# Patient Record
Sex: MALE | Race: WHITE | NOT HISPANIC OR LATINO | Employment: OTHER | URBAN - METROPOLITAN AREA
[De-identification: names, ages, dates, MRNs, and addresses within clinical notes are randomized per-mention and may not be internally consistent; named-entity substitution may affect disease eponyms.]

---

## 2017-10-12 ENCOUNTER — TRANSFERRED RECORDS (OUTPATIENT)
Dept: HEALTH INFORMATION MANAGEMENT | Facility: CLINIC | Age: 52
End: 2017-10-12

## 2017-10-18 NOTE — TELEPHONE ENCOUNTER
"APPT INFORMATION    Date /Time: 10/24/17 3PM   Reason for Appt: L Shoulder Large Deep Lipoma   Ref Provider/Clinic: Dr. Dawood To   Patient Contact (Y/N) & Call Details: No - pt is referre   Action: Faxed request to [facility] for recs/img     RECORDS CLINIC NAME  (\"None\" if no records ) RECEIVED RECS & IMG? Y/N   (may include other helpful notes)   Internal Clinics: none         External Clinics: [facility]  Waiting for records/imaging            "

## 2017-10-19 NOTE — TELEPHONE ENCOUNTER
Records Received From:  O     Date/Exam/Location  (specify location if different)   Office Notes:  10/18/17, 10/6/17   Radiology Reports:  xray left shoulder 10/6/17  MR arthrogram left shoulder 10/12/17  MRI Upper ext joint left 1/29/07- [facility]     [facility] Notified (Y/N): no

## 2017-10-20 NOTE — TELEPHONE ENCOUNTER
Records Received From:  [facility]     Date/Exam/Location  (specify location if different)   Procedure Notes:  (include injections) - arthrography injection without cortisone left shoulder 10/12/17

## 2017-10-23 NOTE — TELEPHONE ENCOUNTER
Received Imaging From: [facility]    Image Type (x): Disc:_____    Pacs:__x___      Exam Date/Name: MRI L Shoulder 10/12/17 Comments: Notified Paz to pull     Phone Call:    Who did you talk to? (or) Who did you call?  Sarah at [facility]    Call Detail/Action: Asked for XR L Shoulder 10/6/17 be pushed to [facility]. She will notify her IT dept to push

## 2017-10-24 ENCOUNTER — PRE VISIT (OUTPATIENT)
Dept: ORTHOPEDICS | Facility: CLINIC | Age: 52
End: 2017-10-24

## 2017-10-24 ENCOUNTER — OFFICE VISIT (OUTPATIENT)
Dept: ORTHOPEDICS | Facility: CLINIC | Age: 52
End: 2017-10-24

## 2017-10-24 VITALS — HEIGHT: 72 IN | WEIGHT: 254.3 LBS | BODY MASS INDEX: 34.44 KG/M2

## 2017-10-24 DIAGNOSIS — D17.9 INTRAMUSCULAR LIPOMA: Primary | ICD-10-CM

## 2017-10-24 NOTE — PROGRESS NOTES
Dear Dr. To,    Thank you for asking me to see Geronimo Arvizu. 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 It is my understanding that you've recommend this be removed prior to your shoulder reconstructive surgery. I'm in agreement with that plan. Details of our assessment and plan are described in Dr. Vega's release note. I agree with his assessment and plan.    In summary we will schedule surgical excision at a convenient time for the patient. Risk and benefits were reviewed. All questions were answered. He was seen today in consultation at your request for 30 minutes with greater than 15 minutes spent in answering questions coordinating his care  Answers for HPI/ROS submitted by the patient on 10/24/2017   General Symptoms: No  Skin Symptoms: No  HENT Symptoms: No  EYE SYMPTOMS: No  HEART SYMPTOMS: No  LUNG SYMPTOMS: No  INTESTINAL SYMPTOMS: No  URINARY SYMPTOMS: No  REPRODUCTIVE SYMPTOMS: No  SKELETAL SYMPTOMS: No  BLOOD SYMPTOMS: No  NERVOUS SYSTEM SYMPTOMS: No  MENTAL HEALTH SYMPTOMS: No

## 2017-10-24 NOTE — NURSING NOTE
Teaching Flowsheet   Relevant Diagnosis: removal left shoulder tumor  Teaching Topic: preop     Person(s) involved in teaching:   Patient     Motivation Level:  Asks Questions: Yes  Eager to Learn: Yes  Cooperative: Yes  Receptive (willing/able to accept information): Yes  Any cultural factors/Jain beliefs that may influence understanding or compliance? No       Patient demonstrates understanding of the following:  Reason for the appointment, diagnosis and treatment plan: Yes  Knowledge of proper use of medications and conditions for which they are ordered (with special attention to potential side effects or drug interactions): Yes  Which situations necessitate calling provider and whom to contact: Yes       Teaching Concerns Addressed:        Proper use and care of soap (medical equip, care aids, etc.): Yes  Nutritional needs and diet plan: Yes  Pain management techniques: Yes  Wound Care: Yes  How and/when to access community resources: NA     Instructional Materials Used/Given: surgery packet reviewed with the patient.  He is in [facility] and very busy at this time of year.  He will review his schedule and call us to schedule before the end of the year.  He will obtain H&P with PCP. He takes no medications, and will reduce his alcohol intake prior to surgery.  He has no further questions at this time.

## 2017-10-24 NOTE — PROGRESS NOTES
[facility] Physicians, Orthopaedic Surgery Consultation    Geronimo Arvizu MRN# [id number]   Age: 52 year old YOB: 1965     Requesting physician: Dawood To            Assessment and Plan:   Assessment:  52-year-old RHD male with an asympomatic left deltoid intramuscular lipoma found during workup of chronic left shoulder instability and nearly doubled in size since previous MRI in 2007.     Plan:  We discussed with the patient today that we would recommend this lipomatous tumor be removed prior to the planned procedure for his shoulder instability. We discussed with the patient the risks benefits and alternatives of the procedure particularly the risk of infection and the risk of recurrence. Patient understands these risks. We also discussed with the patient that he may undergo his subsequent procedure 4-6 weeks following the removal of the fatty tumor. We will also send the tissue for biopsy and discussed with the patient the findings of this. Patient will schedule excision of the fatty tumor which will likely be done through a anterior deltopectoral approach and if the tumor is not able to be removed entirely from the front we may make a second incision and approach this from posterior.           History of Present Illness:   52 year old male  chief complaint: Left shoulder instability, incidental finding soft tissue tumor    HPI: 52-year-old RHD male with long-standing left shoulder pain worked up at outside facility for recurrent dislocations. Patient reports that he orginially dislocated his left shoulder when he was in eighth grade and subsequently has had a proximally 50 episodes of subluxation or dislocation of the shoulder. He has had to have his shoulder reduced in the emergency department with the last time being approximately 20 years ago. Currently, he reports his shoulder does pop in and out and he is able to reduce it on its own. He reports his symptoms are worse in external  rotation. He has participated in physical therapy in the past but none recently. Patient was evaluated at [facility] orthopedics and an MRI was obtained. This did show labral and Hill-Sachs pathology also re-demonstrated a intramuscular lipoma, increased in size. Patient was referred to the tumor clinic see the orthopaedics oncologists at [place] for consideration of removal of the lipoma as this would potentially interfere with the surgical procedure for treatment of his labral pathology and shoulder instability.    In regards to the mass in his left shoulder he reports he first noticed it approximately 10 years ago and was noted on previous MRI. He reports it has possibly doubled in size since that time. The mass itself is asymptomatic to him.           Physical Exam:     EXAMINATION pertinent findings:   VITAL SIGNS: Height 1.829 m (6'), weight 115.3 kg (254 lb 4.8 oz).  Body mass index is 34.49 kg/(m^2).  RESP: non labored breathing   ABD: benign   SKIN: grossly normal   LYMPHATIC: grossly normal   NEURO: grossly normal   VASCULAR: satisfactory perfusion of all extremities   MUSCULOSKELETAL:   Patient's left upper extremity was examined today. His skin is overall intact but there is a visible and palpable lesion prominently in the anterior deltoid. This mass is fairly well circumscribed and does not move with internal/external rotation of the shoulder. The mass is nontender to him. Again there are no overlying skin changes. The mass itself feels solid and again fairly well circumscribed. Patient has somewhat limited range of motion in the shoulder particularly with forward flexion. He has sufficient external and internal rotation when compared to the chondral side. He has 5 out of 5 strength in his deltoid and biceps. He does have a positive [place]'s test with negative speed and is in test. He is CMS intact distally.            Data:   All laboratory data reviewed  All imaging studies reviewed by me    MRI  dated 1/29/2017 previously demonstrated fat deep to the deltoid muscle with the appearance of a lipoma measuring proximal placed 7 x 6 x 1.2 cm.  MRI dated 10/12/2017 again demonstrates a fatty mass deep or within the deep aspect of the deltoid muscle again with the parents of the lipoma measuring approximately 11 x 11 x 3.1 cm. This also demonstrates a anterior inferior labral tear and a large Hill-Sachs deformity posteriorly.    Patient seen and discussed with Dr. Janiya Vega MD  PGY-4 Orthopaedic Surgery    Total Time = 30 min, 50% of which was spent in counseling and coordination of care as documented above.      DATA for DOCUMENTATION:         Past Medical History:     Patient Active Problem List   Diagnosis     Left  Kne pain     Recurrent dislocation of shoulder joint     No past medical history on file.    Also see scanned health assessment forms.       Past Surgical History:   No past surgical history on file.         Social History:     Social History     Social History     Marital status:      Spouse name: N/A     Number of children: N/A     Years of education: N/A     Occupational History     Not on file.     Social History Main Topics     Smoking status: Never Smoker     Smokeless tobacco: Not on file     Alcohol use Yes      Comment: Occ.      Drug use: No     Sexual activity: Not on file     Other Topics Concern     Not on file     Social History Narrative   Works as          Family History:     No family history on file.         Medications:     No current outpatient prescriptions on file.     No current facility-administered medications for this visit.               Review of Systems:   A comprehensive 10 point review of systems (constitutional, ENT, cardiac, peripheral vascular, lymphatic, respiratory, GI, , Musculoskeletal, skin, Neurological) was performed and found to be negative except as described in this note.     See intake form completed by patient    Answers for  HPI/ROS submitted by the patient on 10/24/2017   General Symptoms: No  Skin Symptoms: No  HENT Symptoms: No  EYE SYMPTOMS: No  HEART SYMPTOMS: No  LUNG SYMPTOMS: No  INTESTINAL SYMPTOMS: No  URINARY SYMPTOMS: No  REPRODUCTIVE SYMPTOMS: No  SKELETAL SYMPTOMS: No  BLOOD SYMPTOMS: No  NERVOUS SYSTEM SYMPTOMS: No  MENTAL HEALTH SYMPTOMS: No

## 2017-10-24 NOTE — NURSING NOTE
Chief Complaint   Patient presents with     Consult     Pt states that he is here today for some type of Tumor of his Left Shoulder. He states that he would like to have a Left Shoulder Arthroplasty done, but needs the tumor removed before he can do so. Referring:  CORY FOX       52 year old  1965    Ht 1.829 m (6')  Wt 115.3 kg (254 lb 4.8 oz)  BMI 34.49 kg/m2            Pain Assessment  Patient Currently in Pain: Yes  Additional Pain Assessment Tools: Word Scale  Word Pain Scale: Mild pain  Primary Pain Location: Shoulder  Pain Orientation: Left  Aggravating Factors:  (Pt states that he had contrast injected for his recent MRI that has made it more bothersome, but very mild he states.)               [facility] - [place], [place] - [address]    No Known Allergies  No current outpatient prescriptions on file.     No current facility-administered medications for this visit.                  Casisdy Spangler C.M.A.

## 2017-10-24 NOTE — LETTER
10/24/2017       RE: Geronimo Arvizu  [address]  [place] [address]     Dear Colleague,    Thank you for referring your patient, Geronimo Arvizu, to the [facility] at [facility]. Please see a copy of my visit note below.      [facility] Physicians, Orthopaedic Surgery Consultation    Geronimo Arvizu MRN# [id number]   Age: 52 year old YOB: 1965     Requesting physician: Dawood To            Assessment and Plan:   Assessment:  52-year-old RHD male with an asympomatic left deltoid intramuscular lipoma found during workup of chronic left shoulder instability and nearly doubled in size since previous MRI in 2007.     Plan:  We discussed with the patient today that we would recommend this lipomatous tumor be removed prior to the planned procedure for his shoulder instability. We discussed with the patient the risks benefits and alternatives of the procedure particularly the risk of infection and the risk of recurrence. Patient understands these risks. We also discussed with the patient that he may undergo his subsequent procedure 4-6 weeks following the removal of the fatty tumor. We will also send the tissue for biopsy and discussed with the patient the findings of this. Patient will schedule excision of the fatty tumor which will likely be done through a anterior deltopectoral approach and if the tumor is not able to be removed entirely from the front we may make a second incision and approach this from posterior.           History of Present Illness:   52 year old male  chief complaint: Left shoulder instability, incidental finding soft tissue tumor    HPI: 52-year-old RHD male with long-standing left shoulder pain worked up at outside facility for recurrent dislocations. Patient reports that he orginially dislocated his left shoulder when he was in eighth grade and subsequently has had a proximally 50 episodes of subluxation or  dislocation of the shoulder. He has had to have his shoulder reduced in the emergency department with the last time being approximately 20 years ago. Currently, he reports his shoulder does pop in and out and he is able to reduce it on its own. He reports his symptoms are worse in external rotation. He has participated in physical therapy in the past but none recently. Patient was evaluated at [facility] orthopedics and an MRI was obtained. This did show labral and Hill-Sachs pathology also re-demonstrated a intramuscular lipoma, increased in size. Patient was referred to the tumor clinic see the orthopaedics oncologists at [place] for consideration of removal of the lipoma as this would potentially interfere with the surgical procedure for treatment of his labral pathology and shoulder instability.    In regards to the mass in his left shoulder he reports he first noticed it approximately 10 years ago and was noted on previous MRI. He reports it has possibly doubled in size since that time. The mass itself is asymptomatic to him.           Physical Exam:     EXAMINATION pertinent findings:   VITAL SIGNS: Height 1.829 m (6'), weight 115.3 kg (254 lb 4.8 oz).  Body mass index is 34.49 kg/(m^2).  RESP: non labored breathing   ABD: benign   SKIN: grossly normal   LYMPHATIC: grossly normal   NEURO: grossly normal   VASCULAR: satisfactory perfusion of all extremities   MUSCULOSKELETAL:   Patient's left upper extremity was examined today. His skin is overall intact but there is a visible and palpable lesion prominently in the anterior deltoid. This mass is fairly well circumscribed and does not move with internal/external rotation of the shoulder. The mass is nontender to him. Again there are no overlying skin changes. The mass itself feels solid and again fairly well circumscribed. Patient has somewhat limited range of motion in the shoulder particularly with forward flexion. He has sufficient external and internal  rotation when compared to the chondral side. He has 5 out of 5 strength in his deltoid and biceps. He does have a positive [place]'s test with negative speed and is in test. He is CMS intact distally.            Data:   All laboratory data reviewed  All imaging studies reviewed by me    MRI dated 1/29/2017 previously demonstrated fat deep to the deltoid muscle with the appearance of a lipoma measuring proximal placed 7 x 6 x 1.2 cm.  MRI dated 10/12/2017 again demonstrates a fatty mass deep or within the deep aspect of the deltoid muscle again with the parents of the lipoma measuring approximately 11 x 11 x 3.1 cm. This also demonstrates a anterior inferior labral tear and a large Hill-Sachs deformity posteriorly.    Patient seen and discussed with Dr. Janiya Vega MD  PGY-4 Orthopaedic Surgery    Total Time = 30 min, 50% of which was spent in counseling and coordination of care as documented above.      DATA for DOCUMENTATION:         Past Medical History:     Patient Active Problem List   Diagnosis     Left  Kne pain     Recurrent dislocation of shoulder joint     No past medical history on file.    Also see scanned health assessment forms.       Past Surgical History:   No past surgical history on file.         Social History:     Social History     Social History     Marital status:      Spouse name: N/A     Number of children: N/A     Years of education: N/A     Occupational History     Not on file.     Social History Main Topics     Smoking status: Never Smoker     Smokeless tobacco: Not on file     Alcohol use Yes      Comment: Occ.      Drug use: No     Sexual activity: Not on file     Other Topics Concern     Not on file     Social History Narrative   Works as [employer]           Family History:   No family history on file.         Medications:     No current outpatient prescriptions on file.     No current facility-administered medications for this visit.               Review of Systems:   A  comprehensive 10 point review of systems (constitutional, ENT, cardiac, peripheral vascular, lymphatic, respiratory, GI, , Musculoskeletal, skin, Neurological) was performed and found to be negative except as described in this note.     See intake form completed by patient    Dear Dr. To,    Thank you for asking me to see Geronimo Arvizu. 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 It is my understanding that you've recommend this be removed prior to your shoulder reconstructive surgery. I'm in agreement with that plan. Details of our assessment and plan are described in Dr. Vega's release note. I agree with his assessment and plan.    In summary we will schedule surgical excision at a convenient time for the patient. Risk and benefits were reviewed. All questions were answered. He was seen today in consultation at your request for 30 minutes with greater than 15 minutes spent in answering questions coordinating his care    Sincerely,    Callum Denson MD

## 2017-10-24 NOTE — MR AVS SNAPSHOT
After Visit Summary   10/24/2017    Geroinmo Arvizu    MRN: [id number]           Patient Information     Date Of Birth          1965        Visit Information        Provider Department      10/24/2017 3:00 PM Callum Denson MD [facility]        Today's Diagnoses     Intramuscular lipoma    -  1       Follow-ups after your visit        Who to contact     Please call your clinic at [phone] to:    Ask questions about your health    Make or cancel appointments    Discuss your medicines    Learn about your test results    Speak to your doctor   If you have compliments or concerns about an experience at your clinic, or if you wish to file a complaint, please contact [facility] Patient Relations at [phone] or email us at [email or web address]         Additional Information About Your Visit        MyChart Information     [employer] gives you secure access to your electronic health record. If you see a primary care provider, you can also send messages to your care team and make appointments. If you have questions, please call your primary care clinic.  If you do not have a primary care provider, please call [phone] and they will assist you.      [employer] is an electronic gateway that provides easy, online access to your medical records. With [employer], you can request a clinic appointment, read your test results, renew a prescription or communicate with your care team.     To access your existing account, please contact your [facility] Clinic or call [phone] for assistance.        Care EveryWhere ID     This is your Care EveryWhere ID. This could be used by other organizations to access your [place] medical records  [id number]        Your Vitals Were     Height BMI (Body Mass Index)                1.829 m (6') 34.49 kg/m2           Blood Pressure from Last 3 Encounters:   01/24/07 134/83    Weight from Last  3 Encounters:   10/24/17 115.3 kg (254 lb 4.8 oz)   01/24/07 105.3 kg (232 lb 3.2 oz)              We Performed the Following     Stephanie-Operative Worksheet ([place]/Janiya)        Primary Care Provider    Physician No Ref-Primary       NO REF-PRIMARY PHYSICIAN        Equal Access to Services     MADELEINESTORMY DOROTHY : Hadii aad ku hadasho Soomaali, waaxda luqadaha, qaybta kaalmada adekimmyyada, kiana abbasi . So [facility] [phone].    ATENCIÓN: Si habla español, tiene a tejeda disposición servicios gratuitos de asistencia lingüística. Llame al [phone].    We comply with applicable federal civil rights laws and [place] laws. We do not discriminate on the basis of race, color, national origin, age, disability, sex, sexual orientation, or gender identity.            Thank you!     Thank you for choosing [facility]  for your care. Our goal is always to provide you with excellent care. Hearing back from our patients is one way we can continue to improve our services. Please take a few minutes to complete the written survey that you may receive in the mail after your visit with us. Thank you!             Your Updated Medication List - Protect others around you: Learn how to safely use, store and throw away your medicines at www.disposemymeds.org.      Notice  As of 10/24/2017  5:09 PM    You have not been prescribed any medications.

## 2017-11-02 ENCOUNTER — TELEPHONE (OUTPATIENT)
Dept: ORTHOPEDICS | Facility: CLINIC | Age: 52
End: 2017-11-02

## 2017-11-10 ENCOUNTER — OFFICE VISIT (OUTPATIENT)
Dept: FAMILY MEDICINE | Facility: CLINIC | Age: 52
End: 2017-11-10
Payer: COMMERCIAL

## 2017-11-10 VITALS
BODY MASS INDEX: 33.05 KG/M2 | WEIGHT: 244 LBS | DIASTOLIC BLOOD PRESSURE: 88 MMHG | HEIGHT: 72 IN | TEMPERATURE: 98.6 F | HEART RATE: 71 BPM | SYSTOLIC BLOOD PRESSURE: 137 MMHG

## 2017-11-10 DIAGNOSIS — Z12.11 COLON CANCER SCREENING: ICD-10-CM

## 2017-11-10 DIAGNOSIS — C85.94: ICD-10-CM

## 2017-11-10 DIAGNOSIS — Z01.818 PREOP GENERAL PHYSICAL EXAM: Primary | ICD-10-CM

## 2017-11-10 LAB
BASOPHILS # BLD AUTO: 0 10E9/L (ref 0–0.2)
BASOPHILS NFR BLD AUTO: 0.2 %
CREAT SERPL-MCNC: 1.05 MG/DL (ref 0.66–1.25)
DIFFERENTIAL METHOD BLD: NORMAL
EOSINOPHIL # BLD AUTO: 0.3 10E9/L (ref 0–0.7)
EOSINOPHIL NFR BLD AUTO: 3.5 %
ERYTHROCYTE [DISTWIDTH] IN BLOOD BY AUTOMATED COUNT: 12.9 % (ref 10–15)
GFR SERPL CREATININE-BSD FRML MDRD: 74 ML/MIN/1.7M2
HCT VFR BLD AUTO: 47.7 % (ref 40–53)
HGB BLD-MCNC: 15.8 G/DL (ref 13.3–17.7)
LYMPHOCYTES # BLD AUTO: 2.8 10E9/L (ref 0.8–5.3)
LYMPHOCYTES NFR BLD AUTO: 32.8 %
MCH RBC QN AUTO: 29.3 PG (ref 26.5–33)
MCHC RBC AUTO-ENTMCNC: 33.1 G/DL (ref 31.5–36.5)
MCV RBC AUTO: 89 FL (ref 78–100)
MONOCYTES # BLD AUTO: 0.8 10E9/L (ref 0–1.3)
MONOCYTES NFR BLD AUTO: 9.6 %
NEUTROPHILS # BLD AUTO: 4.6 10E9/L (ref 1.6–8.3)
NEUTROPHILS NFR BLD AUTO: 53.9 %
PLATELET # BLD AUTO: 301 10E9/L (ref 150–450)
RBC # BLD AUTO: 5.39 10E12/L (ref 4.4–5.9)
WBC # BLD AUTO: 8.5 10E9/L (ref 4–11)

## 2017-11-10 PROCEDURE — 85025 COMPLETE CBC W/AUTO DIFF WBC: CPT | Performed by: FAMILY MEDICINE

## 2017-11-10 PROCEDURE — 82565 ASSAY OF CREATININE: CPT | Performed by: FAMILY MEDICINE

## 2017-11-10 PROCEDURE — 36415 COLL VENOUS BLD VENIPUNCTURE: CPT | Performed by: FAMILY MEDICINE

## 2017-11-10 PROCEDURE — 93000 ELECTROCARDIOGRAM COMPLETE: CPT | Performed by: FAMILY MEDICINE

## 2017-11-10 PROCEDURE — 99202 OFFICE O/P NEW SF 15 MIN: CPT | Performed by: FAMILY MEDICINE

## 2017-11-10 NOTE — LETTER
November 15, 2017      Geronimo Luh  [address] [facility] [place] [address]        Dear ,    We are writing to inform you of your test results.  Please inform the patient of this normal testOK for surgery.A copy of these results is enclosed.   If you have any questions or concerns, please call the clinic at the number listed above.       Sincerely,        Jorge L Serra MD

## 2017-11-10 NOTE — MR AVS SNAPSHOT
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 This includes signs of a cold or flu (such as a sore throat, runny nose, cough, rash or fever).    Do not smoke, drink alcohol or take over the counter medicine (unless your surgeon or primary care doctor tells you to) for the 24 hours before and after surgery.    If you take prescribed drugs: Follow your doctor s orders about which medicines to take and which to stop until after surgery.    Eating and drinking prior to surgery: follow the instructions from your surgeon    Take a shower or bath the night before surgery. Use the soap your surgeon gave you to gently clean your skin. If you do not have soap from your surgeon, use your regular soap. Do not shave or scrub the surgery site.  Wear clean pajamas and have clean sheets on your bed.             Thank you for choosing [facility].  You may be receiving a survey in the mail from [employer] regarding your visit today.  Please take a few minutes to complete and return the survey to let us know how we are doing.      If you have questions or concerns, please contact us via [employer]-SIM or you can contact your care team at [phone].    Our Clinic hours are:  Monday 6:40 am  to 7:00 pm  Tuesday -Friday 6:40 am to 5:00 pm    The [place] outpatient lab hours are:  Monday - Friday 6:10 am to 4:45 pm  Saturdays 7:00 am to 11:00 am  Appointments are required, call [phone]    If you have clinical questions after  hours or would like to schedule an appointment,  call the clinic at [phone].      IMPRESSION:                                                    Reason for surgery/procedure: Geronimo Arvizu (: 1965) presents for pre-operative evaluation assessment as requested by Dr. Denson.  He requires evaluation and anesthesia risk assessment prior to undergoing surgery/procedure for treatment of left shoulder area lymphoma, tumor. Proposed procedure: Removal Left Shoulder Tumor.    The proposed surgical procedure is considered LOW risk.    REVISED CARDIAC RISK INDEX  The patient has the following serious cardiovascular risks for perioperative complications such as (MI, PE, VFib and 3  AV Block):  No serious cardiac risks  INTERPRETATION: 0 risks: Class I (very low risk - 0.4% complication rate)    The patient has the following additional risks for perioperative complications:  No identified additional risks      ICD-10-CM    1. Preop general physical exam Z01.818        RECOMMENDATIONS:                                                      --Patient is to take all scheduled medications on the day before surgery EXCEPT for modifications listed below.  Take no aspirin or advil or aleve for one week before surgery. Tylenol is OK.   Your stomach should be empty for  8 hours before surgery.     APPROVAL GIVEN to proceed with proposed procedure, without further diagnostic evaluation            Follow-ups after your visit        Additional Services     GASTROENTEROLOGY ADULT REF PROCEDURE ONLY       Last Lab Result: No results found for: CR  Body mass index is 33.09 kg/(m^2).     Needed:  No  Language:  English    Patient will be contacted to schedule procedure.     Please be aware that coverage of these services is subject to the terms and limitations of your health insurance plan.  Call member services at your health plan with any benefit or coverage questions.  Any procedures must be performed at a [place]  facility OR coordinated by your clinic's referral office.    Please bring the following with you to your appointment:    (1) Any X-Rays, CTs or MRIs which have been performed.  Contact the facility where they were done to arrange for  prior to your scheduled appointment.    (2) List of current medications   (3) This referral request   (4) Any documents/labs given to you for this referral                  Your next 10 appointments already scheduled     Nov 16, 2017   Procedure with Callum Denson MD   [facility] ([facility])    [address] [place] [address]  5th Floor  [employer] [address] [phone]           Located in the Clinics and Surgery Center at [address] [place] [address].   parking is very convenient and highly recommended.  is a $6 flat rate fee.  Both  and self parkers should enter the main arrival plaza from [facility]; parking attendants will direct you based on your parking preference.              Who to contact     If you have questions or need follow up information about today's clinic visit or your schedule please contact [facility] directly at [phone].  Normal or non-critical lab and imaging results will be communicated to you by MyChart, letter or phone within 4 business days after the clinic has received the results. If you do not hear from us within 7 days, please contact the clinic through [employer] or phone. If you have a critical or abnormal lab result, we will notify you by phone as soon as possible.  Submit refill requests through [employer] or call your pharmacy and they will forward the refill request to us. Please allow 3 business days for your refill to be completed.          Additional Information About Your Visit        [employer] Information     [employer] gives you secure access to your electronic health record. If you see a primary care provider, you can also send messages to  your care team and make appointments. If you have questions, please call your primary care clinic.  If you do not have a primary care provider, please call [phone] and they will assist you.        Care EveryWhere ID     This is your Care EveryWhere ID. This could be used by other organizations to access your [facility] medical records  [id number]        Your Vitals Were     Pulse Temperature Height BMI (Body Mass Index)          71 98.6  F (37  C) (Tympanic) 6' (1.829 m) 33.09 kg/m2         Blood Pressure from Last 3 Encounters:   11/10/17 142/87   01/24/07 134/83    Weight from Last 3 Encounters:   11/10/17 244 lb (110.7 kg)   10/24/17 254 lb 4.8 oz (115.3 kg)   01/24/07 232 lb 3.2 oz (105.3 kg)              We Performed the Following     CBC with platelets differential     Creatinine     EKG 12-lead complete w/read - Clinics     GASTROENTEROLOGY ADULT REF PROCEDURE ONLY        Primary Care Provider    Physician No Ref-Primary       NO REF-PRIMARY PHYSICIAN        Equal Access to Services     JASON DE LA CRUZ : Hadii aad ku hadasho Soomaali, waaxda luqadaha, qaybta kaalmada adejudson, kiana abbasi . So [facility] [phone].    ATENCIÓN: Si habla español, tiene a tejeda disposición servicios gratuitos de asistencia lingüística. Llame al [phone].    We comply with applicable federal civil rights laws and [place] laws. We do not discriminate on the basis of race, color, national origin, age, disability, sex, sexual orientation, or gender identity.            Thank you!     Thank you for choosing [facility]  for your care. Our goal is always to provide you with excellent care. Hearing back from our patients is one way we can continue to improve our services. Please take a few minutes to complete the written survey that you may receive in the mail after your visit with us. Thank you!             Your Updated Medication List - Protect others around you: Learn how to safely use,  store and throw away your medicines at www.disposemymeds.org.      Notice  As of 11/10/2017  2:03 PM    You have not been prescribed any medications.

## 2017-11-10 NOTE — PATIENT INSTRUCTIONS
Before Your Surgery      Call your surgeon if there is any change in your health. This includes signs of a cold or flu (such as a sore throat, runny nose, cough, rash or fever).    Do not smoke, drink alcohol or take over the counter medicine (unless your surgeon or primary care doctor tells you to) for the 24 hours before and after surgery.    If you take prescribed drugs: Follow your doctor s orders about which medicines to take and which to stop until after surgery.    Eating and drinking prior to surgery: follow the instructions from your surgeon    Take a shower or bath the night before surgery. Use the soap your surgeon gave you to gently clean your skin. If you do not have soap from your surgeon, use your regular soap. Do not shave or scrub the surgery site.  Wear clean pajamas and have clean sheets on your bed.             Thank you for choosing [facility].  You may be receiving a survey in the mail from [facility] regarding your visit today.  Please take a few minutes to complete and return the survey to let us know how we are doing.      If you have questions or concerns, please contact us via [employer] or you can contact your care team at [phone].    Our Clinic hours are:  Monday 6:40 am  to 7:00 pm   6:40 am to 5:00 pm    The [place] outpatient lab hours are:  Monday - Friday 6:10 am to 4:45 pm   7:00 am to 11:00 am  Appointments are required, call [phone]    If you have clinical questions after hours or would like to schedule an appointment,  call the clinic at [phone].      IMPRESSION:                                                    Reason for surgery/procedure: Geronimo Arvizu (: 1965) presents for pre-operative evaluation assessment as requested by Dr. Denson.  He requires evaluation and anesthesia risk assessment prior to undergoing surgery/procedure for treatment of left shoulder area lymphoma, tumor. Proposed procedure: Removal Left Shoulder  Tumor.    The proposed surgical procedure is considered LOW risk.    REVISED CARDIAC RISK INDEX  The patient has the following serious cardiovascular risks for perioperative complications such as (MI, PE, VFib and 3  AV Block):  No serious cardiac risks  INTERPRETATION: 0 risks: Class I (very low risk - 0.4% complication rate)    The patient has the following additional risks for perioperative complications:  No identified additional risks      ICD-10-CM    1. Preop general physical exam Z01.818        RECOMMENDATIONS:                                                      --Patient is to take all scheduled medications on the day before surgery EXCEPT for modifications listed below.  Take no aspirin or advil or aleve for one week before surgery. Tylenol is OK.   Your stomach should be empty for  8 hours before surgery.     APPROVAL GIVEN to proceed with proposed procedure, without further diagnostic evaluation

## 2017-11-10 NOTE — NURSING NOTE
Chief Complaint   Patient presents with     Pre-Op Exam     Pre-op physical.     Shoulder Pain     Ever since he had the MRI of the left shoulder done with the dye, he has been having pain.     Health Maintenance     Colonoscopy order and prep given to the patient.       Initial /88  Pulse 71  Temp 98.6  F (37  C) (Tympanic)  Ht 6' (1.829 m)  Wt 244 lb (110.7 kg)  BMI 33.09 kg/m2 Estimated body mass index is 33.09 kg/(m^2) as calculated from the following:    Height as of this encounter: 6' (1.829 m).    Weight as of this encounter: 244 lb (110.7 kg).  Medication Reconciliation: complete

## 2017-11-10 NOTE — PROGRESS NOTES
[facility]  [address] [address]  [phone]  Dept: [phone]    PRE-OP EVALUATION:  Today's date: 11/10/2017    Geronimo Arvizu (: 1965) presents for pre-operative evaluation assessment as requested by Dr. Denson.  He requires evaluation and anesthesia risk assessment prior to undergoing surgery/procedure for treatment of left shoulder area lymphoma, tumor. Proposed procedure: Removal Left Shoulder Tumor.    Date of Surgery/ Procedure: 17  Time of Surgery/ Procedure: 9:05 am per [facility].  Hospital/Surgical Facility: [facility]  Fax number for surgical facility: No fax is needed as it is in [facility].  Primary Physician: Jorge L Serra  Type of Anesthesia Anticipated: General    Patient has a Health Care Directive or Living Will:  NO    1. NO - Do you have a history of heart attack, stroke, stent, bypass or surgery on an artery in the head, neck, heart or legs?  2. NO - Do you ever have any pain or discomfort in your chest?  3. NO - Do you have a history of  Heart Failure?  4. NO - Are you troubled by shortness of breath when: walking on the level, up a slight hill or at night?  5. NO - Do you currently have a cold, bronchitis or other respiratory infection?  6. NO - Do you have a cough, shortness of breath or wheezing?  7. NO - Do you sometimes get pains in the calves of your legs when you walk?  8. NO - Do you or anyone in your family have previous history of blood clots?  9. NO - Do you or does anyone in your family have a serious bleeding problem such as prolonged bleeding following surgeries or cuts?  10. NO - Have you ever had problems with anemia or been told to take iron pills?  11. NO - Have you had any abnormal blood loss such as black, tarry or bloody stools, or abnormal vaginal bleeding?  12. NO - Have you ever had a blood transfusion?  13. NO - Have you or any of your relatives ever had problems with anesthesia?  14. NO - Do you  have sleep apnea, excessive snoring or daytime drowsiness?  15. NO - Do you have any prosthetic heart valves?  16. NO - Do you have prosthetic joints?      HPI:                                                      Brief HPI related to upcoming procedure: see above.       See problem list for active medical problems.  Problems all longstanding and stable, except as noted/documented.  See ROS for pertinent symptoms related to these conditions.                                                                                                  .    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  would get MRI evaluate ligamenture 0 -  Problem list name updated by automated process. Provider to review        History reviewed. No pertinent past medical history.  History reviewed. No pertinent surgical history.  No current outpatient prescriptions on file.     OTC products: None, except as noted above    No Known Allergies   Latex Allergy: NO    Social History   Substance Use Topics     Smoking status: Former Smoker     Smokeless tobacco: Never Used      Comment: Quit in his early 30's.     Alcohol use Yes      Comment: Occ.      History   Drug Use No       REVIEW OF SYSTEMS:                                                    C: NEGATIVE for fever, chills, change in weight  E/M: NEGATIVE for ear, mouth and throat problems  R: NEGATIVE for significant cough or SOB  CV: NEGATIVE for chest pain, palpitations or peripheral edema    EXAM:                                                    /87  Pulse 71  Temp 98.6  F (37  C) (Tympanic)  Ht 6' (1.829 m)   Wt 244 lb (110.7 kg)  BMI 33.09 kg/m2  Exam:  GENERAL APPEARANCE: healthy, alert and no distress  EYES: EOMI,  PERRL  HENT: ear canals and TM's normal and nose and mouth without ulcers or lesions  NECK: no adenopathy, no asymmetry, masses, or scars and thyroid normal to palpation  RESP: lungs clear to auscultation - no rales, rhonchi or wheezes  CV: regular rates and rhythm, normal S1 S2, no S3 or S4 and no murmur, click or rub -  ABDOMEN:  soft, nontender, no HSM or masses and bowel sounds normal  GU_male: testicles normal without atrophy or masses, no hernias and penis normal without urethral discharge  MS: on the anterior portion of the left shoulder there is a 13-15 cm soft swelling. It is not red or tender.   SKIN: no suspicious lesions or rashes  NEURO: Normal strength and tone, sensory exam grossly normal, mentation intact and speech normal  PSYCH: mentation appears normal and affect normal/bright  LYMPHATICS: No axillary, cervical, inguinal, or supraclavicular nodes      DIAGNOSTICS:                                                      EKG: appears normal, NSR, normal axis, normal intervals, no acute ST/T changes c/w ischemia, no LVH by voltage criteria, unchanged from previous tracings  Labs Drawn and in Process:   Unresulted Labs Ordered in the Past 30 Days of this Admission     No orders found from 2017 to 2017.        No results for input(s): HGB, PLT, INR, NA, POTASSIUM, CR, A1C in the last 58225 hours.     IMPRESSION:                                                    Reason for surgery/procedure: Geronimo Arvizu (: 1965) presents for pre-operative evaluation assessment as requested by Dr. Denson.  He requires evaluation and anesthesia risk assessment prior to undergoing surgery/procedure for treatment of left shoulder area lymphoma, tumor. Proposed procedure: Removal Left Shoulder Tumor.    The proposed surgical procedure is considered LOW risk.    REVISED CARDIAC RISK INDEX  The  patient has the following serious cardiovascular risks for perioperative complications such as (MI, PE, VFib and 3  AV Block):  No serious cardiac risks  INTERPRETATION: 0 risks: Class I (very low risk - 0.4% complication rate)    The patient has the following additional risks for perioperative complications:  No identified additional risks      ICD-10-CM    1. Preop general physical exam Z01.818        RECOMMENDATIONS:                                                      --Patient is to take all scheduled medications on the day before surgery EXCEPT for modifications listed below.  Take no aspirin or advil or aleve for one week before surgery. Tylenol is OK.   Your stomach should be empty for  8 hours before surgery.     APPROVAL GIVEN to proceed with proposed procedure, without further diagnostic evaluation       Signed Electronically by: Jorge L Serra MD    Copy of this evaluation report is provided to requesting physician.    [facility] Preop Guidelines

## 2017-11-15 ENCOUNTER — ANESTHESIA EVENT (OUTPATIENT)
Dept: SURGERY | Facility: AMBULATORY SURGERY CENTER | Age: 52
End: 2017-11-15

## 2017-11-16 ENCOUNTER — HOSPITAL ENCOUNTER (OUTPATIENT)
Facility: AMBULATORY SURGERY CENTER | Age: 52
End: 2017-11-16
Attending: ORTHOPAEDIC SURGERY

## 2017-11-16 ENCOUNTER — SURGERY (OUTPATIENT)
Age: 52
End: 2017-11-16

## 2017-11-16 ENCOUNTER — ANESTHESIA (OUTPATIENT)
Dept: SURGERY | Facility: AMBULATORY SURGERY CENTER | Age: 52
End: 2017-11-16

## 2017-11-16 VITALS
DIASTOLIC BLOOD PRESSURE: 87 MMHG | BODY MASS INDEX: 33.18 KG/M2 | HEIGHT: 72 IN | HEART RATE: 71 BPM | TEMPERATURE: 97 F | WEIGHT: 245 LBS | SYSTOLIC BLOOD PRESSURE: 135 MMHG | OXYGEN SATURATION: 95 % | RESPIRATION RATE: 20 BRPM

## 2017-11-16 DIAGNOSIS — D17.9 INTRAMUSCULAR LIPOMA: Primary | ICD-10-CM

## 2017-11-16 RX ORDER — ACETAMINOPHEN 325 MG/1
650 TABLET ORAL
Status: DISCONTINUED | OUTPATIENT
Start: 2017-11-16 | End: 2017-11-17 | Stop reason: HOSPADM

## 2017-11-16 RX ORDER — ONDANSETRON 2 MG/ML
4 INJECTION INTRAMUSCULAR; INTRAVENOUS EVERY 30 MIN PRN
Status: DISCONTINUED | OUTPATIENT
Start: 2017-11-16 | End: 2017-11-17 | Stop reason: HOSPADM

## 2017-11-16 RX ORDER — BUPIVACAINE HYDROCHLORIDE AND EPINEPHRINE 2.5; 5 MG/ML; UG/ML
INJECTION, SOLUTION INFILTRATION; PERINEURAL PRN
Status: DISCONTINUED | OUTPATIENT
Start: 2017-11-16 | End: 2017-11-16 | Stop reason: HOSPADM

## 2017-11-16 RX ORDER — FENTANYL CITRATE 50 UG/ML
25-50 INJECTION, SOLUTION INTRAMUSCULAR; INTRAVENOUS EVERY 5 MIN PRN
Status: DISCONTINUED | OUTPATIENT
Start: 2017-11-16 | End: 2017-11-16 | Stop reason: HOSPADM

## 2017-11-16 RX ORDER — DEXAMETHASONE SODIUM PHOSPHATE 4 MG/ML
INJECTION, SOLUTION INTRA-ARTICULAR; INTRALESIONAL; INTRAMUSCULAR; INTRAVENOUS; SOFT TISSUE PRN
Status: DISCONTINUED | OUTPATIENT
Start: 2017-11-16 | End: 2017-11-16

## 2017-11-16 RX ORDER — GABAPENTIN 300 MG/1
300 CAPSULE ORAL ONCE
Status: COMPLETED | OUTPATIENT
Start: 2017-11-16 | End: 2017-11-16

## 2017-11-16 RX ORDER — MEPERIDINE HYDROCHLORIDE 25 MG/ML
12.5 INJECTION INTRAMUSCULAR; INTRAVENOUS; SUBCUTANEOUS
Status: DISCONTINUED | OUTPATIENT
Start: 2017-11-16 | End: 2017-11-17 | Stop reason: HOSPADM

## 2017-11-16 RX ORDER — ONDANSETRON 4 MG/1
4 TABLET, ORALLY DISINTEGRATING ORAL
Status: DISCONTINUED | OUTPATIENT
Start: 2017-11-16 | End: 2017-11-17 | Stop reason: HOSPADM

## 2017-11-16 RX ORDER — ONDANSETRON 4 MG/1
4 TABLET, ORALLY DISINTEGRATING ORAL EVERY 30 MIN PRN
Status: DISCONTINUED | OUTPATIENT
Start: 2017-11-16 | End: 2017-11-17 | Stop reason: HOSPADM

## 2017-11-16 RX ORDER — NALOXONE HYDROCHLORIDE 0.4 MG/ML
.1-.4 INJECTION, SOLUTION INTRAMUSCULAR; INTRAVENOUS; SUBCUTANEOUS
Status: DISCONTINUED | OUTPATIENT
Start: 2017-11-16 | End: 2017-11-17 | Stop reason: HOSPADM

## 2017-11-16 RX ORDER — OXYCODONE HYDROCHLORIDE 5 MG/1
5 TABLET ORAL
Status: COMPLETED | OUTPATIENT
Start: 2017-11-16 | End: 2017-11-16

## 2017-11-16 RX ORDER — GLYCOPYRROLATE 0.2 MG/ML
INJECTION, SOLUTION INTRAMUSCULAR; INTRAVENOUS PRN
Status: DISCONTINUED | OUTPATIENT
Start: 2017-11-16 | End: 2017-11-16

## 2017-11-16 RX ORDER — SODIUM CHLORIDE, SODIUM LACTATE, POTASSIUM CHLORIDE, CALCIUM CHLORIDE 600; 310; 30; 20 MG/100ML; MG/100ML; MG/100ML; MG/100ML
INJECTION, SOLUTION INTRAVENOUS CONTINUOUS
Status: DISCONTINUED | OUTPATIENT
Start: 2017-11-16 | End: 2017-11-17 | Stop reason: HOSPADM

## 2017-11-16 RX ORDER — PROPOFOL 10 MG/ML
INJECTION, EMULSION INTRAVENOUS PRN
Status: DISCONTINUED | OUTPATIENT
Start: 2017-11-16 | End: 2017-11-16

## 2017-11-16 RX ORDER — ACETAMINOPHEN 325 MG/1
975 TABLET ORAL ONCE
Status: COMPLETED | OUTPATIENT
Start: 2017-11-16 | End: 2017-11-16

## 2017-11-16 RX ORDER — ONDANSETRON 2 MG/ML
INJECTION INTRAMUSCULAR; INTRAVENOUS PRN
Status: DISCONTINUED | OUTPATIENT
Start: 2017-11-16 | End: 2017-11-16

## 2017-11-16 RX ORDER — KETOROLAC TROMETHAMINE 30 MG/ML
INJECTION, SOLUTION INTRAMUSCULAR; INTRAVENOUS PRN
Status: DISCONTINUED | OUTPATIENT
Start: 2017-11-16 | End: 2017-11-16

## 2017-11-16 RX ORDER — LIDOCAINE HYDROCHLORIDE 20 MG/ML
INJECTION, SOLUTION INFILTRATION; PERINEURAL PRN
Status: DISCONTINUED | OUTPATIENT
Start: 2017-11-16 | End: 2017-11-16

## 2017-11-16 RX ORDER — OXYCODONE HYDROCHLORIDE 5 MG/1
5-10 TABLET ORAL EVERY 4 HOURS PRN
Qty: 30 TABLET | Refills: 0 | Status: SHIPPED | OUTPATIENT
Start: 2017-11-16 | End: 2019-11-22

## 2017-11-16 RX ORDER — HYDROMORPHONE HYDROCHLORIDE 1 MG/ML
.3-.5 INJECTION, SOLUTION INTRAMUSCULAR; INTRAVENOUS; SUBCUTANEOUS EVERY 10 MIN PRN
Status: DISCONTINUED | OUTPATIENT
Start: 2017-11-16 | End: 2017-11-16 | Stop reason: HOSPADM

## 2017-11-16 RX ORDER — FENTANYL CITRATE 50 UG/ML
INJECTION, SOLUTION INTRAMUSCULAR; INTRAVENOUS PRN
Status: DISCONTINUED | OUTPATIENT
Start: 2017-11-16 | End: 2017-11-16

## 2017-11-16 RX ORDER — SODIUM CHLORIDE, SODIUM LACTATE, POTASSIUM CHLORIDE, CALCIUM CHLORIDE 600; 310; 30; 20 MG/100ML; MG/100ML; MG/100ML; MG/100ML
INJECTION, SOLUTION INTRAVENOUS CONTINUOUS
Status: DISCONTINUED | OUTPATIENT
Start: 2017-11-16 | End: 2017-11-16 | Stop reason: HOSPADM

## 2017-11-16 RX ADMIN — ACETAMINOPHEN 975 MG: 325 TABLET ORAL at 07:59

## 2017-11-16 RX ADMIN — OXYCODONE HYDROCHLORIDE 5 MG: 5 TABLET ORAL at 11:34

## 2017-11-16 RX ADMIN — FENTANYL CITRATE 50 MCG: 50 INJECTION, SOLUTION INTRAMUSCULAR; INTRAVENOUS at 09:56

## 2017-11-16 RX ADMIN — GLYCOPYRROLATE 0.2 MG: 0.2 INJECTION, SOLUTION INTRAMUSCULAR; INTRAVENOUS at 09:39

## 2017-11-16 RX ADMIN — SODIUM CHLORIDE, SODIUM LACTATE, POTASSIUM CHLORIDE, CALCIUM CHLORIDE: 600; 310; 30; 20 INJECTION, SOLUTION INTRAVENOUS at 08:12

## 2017-11-16 RX ADMIN — ONDANSETRON 4 MG: 2 INJECTION INTRAMUSCULAR; INTRAVENOUS at 10:16

## 2017-11-16 RX ADMIN — DEXAMETHASONE SODIUM PHOSPHATE 4 MG: 4 INJECTION, SOLUTION INTRA-ARTICULAR; INTRALESIONAL; INTRAMUSCULAR; INTRAVENOUS; SOFT TISSUE at 09:18

## 2017-11-16 RX ADMIN — FENTANYL CITRATE 50 MCG: 50 INJECTION, SOLUTION INTRAMUSCULAR; INTRAVENOUS at 09:39

## 2017-11-16 RX ADMIN — GABAPENTIN 300 MG: 300 CAPSULE ORAL at 08:00

## 2017-11-16 RX ADMIN — BUPIVACAINE HYDROCHLORIDE AND EPINEPHRINE 20 ML: 2.5; 5 INJECTION, SOLUTION INFILTRATION; PERINEURAL at 10:32

## 2017-11-16 RX ADMIN — KETOROLAC TROMETHAMINE 30 MG: 30 INJECTION, SOLUTION INTRAMUSCULAR; INTRAVENOUS at 10:16

## 2017-11-16 RX ADMIN — PROPOFOL 300 MG: 10 INJECTION, EMULSION INTRAVENOUS at 09:16

## 2017-11-16 RX ADMIN — BUPIVACAINE HYDROCHLORIDE AND EPINEPHRINE 10 ML: 2.5; 5 INJECTION, SOLUTION INFILTRATION; PERINEURAL at 09:42

## 2017-11-16 RX ADMIN — LIDOCAINE HYDROCHLORIDE 100 MG: 20 INJECTION, SOLUTION INFILTRATION; PERINEURAL at 09:16

## 2017-11-16 NOTE — ANESTHESIA POSTPROCEDURE EVALUATION
Patient: Geronimo Arvizu    Procedure(s):  Removal Left Shoulder Tumor - Wound Class: I-Clean    Diagnosis:Tumor  Diagnosis Additional Information: No value filed.    Anesthesia Type:  General, LMA    Note:  Anesthesia Post Evaluation    Patient location during evaluation: PACU  Patient participation: Able to fully participate in evaluation  Level of consciousness: awake and alert  Pain management: adequate  Airway patency: patent  Cardiovascular status: acceptable  Respiratory status: acceptable  Hydration status: acceptable  PONV: none     Anesthetic complications: None          Last vitals:  Vitals:    11/16/17 1049 11/16/17 1100 11/16/17 1110   BP: 137/87 (!) 138/91 135/87   Pulse:   71   Resp: 21 20    Temp: 36.2  C (97.2  F) 36.1  C (97  F)    SpO2: 91% 94% 95%         Electronically Signed By: Win Brown MD  November 16, 2017  11:28 AM

## 2017-11-16 NOTE — ANESTHESIA CARE TRANSFER NOTE
Patient: Geronimo Arvizu    Procedure(s):  Removal Left Shoulder Tumor - Wound Class: I-Clean    Diagnosis: Tumor  Diagnosis Additional Information: No value filed.    Anesthesia Type:   General, LMA     Note:  Airway :Room Air  Patient transferred to:PACU  Comments: VSS and WNL, denies pain, no PONV, report to Ginny SIEGELHandoff Report: Identifed the Patient, Identified the Reponsible Provider, Reviewed the pertinent medical history, Discussed the surgical course, Reviewed Intra-OP anesthesia mangement and issues during anesthesia, Set expectations for post-procedure period and Allowed opportunity for questions and acknowledgement of understanding      Vitals: (Last set prior to Anesthesia Care Transfer)    CRNA VITALS  11/16/2017 1015 - 11/16/2017 1051      11/16/2017             Pulse: 97    SpO2: 96 %    Resp Rate (observed): (!)  3                Electronically Signed By: TIFFANY Doyle CRNA  November 16, 2017  10:51 AM

## 2017-11-16 NOTE — ANESTHESIA PREPROCEDURE EVALUATION
Anesthesia Evaluation     . Pt has had prior anesthetic.     No history of anesthetic complications          ROS/MED HX    ENT/Pulmonary:  - neg pulmonary ROS     Neurologic:  - neg neurologic ROS     Cardiovascular:  - neg cardiovascular ROS   (+) ----. : . . . :. . Previous cardiac testing date:results:date: results:ECG reviewed date:11/4/17 results:NSR date: results:          METS/Exercise Tolerance:  >4 METS   Hematologic:  - neg hematologic  ROS       Musculoskeletal:   (+) , , other musculoskeletal- recurrent left shoulder dislocation      GI/Hepatic:  - neg GI/hepatic ROS       Renal/Genitourinary:  - ROS Renal section negative       Endo:  - neg endo ROS       Psychiatric:  - neg psychiatric ROS       Infectious Disease:  - neg infectious disease ROS       Malignancy:   (+) Malignancy (Shoulder) History of Lymphoma/Leukemia          Other:    (+) no H/O Chronic Pain,                   Physical Exam  Normal systems: cardiovascular and pulmonary    Airway   Mallampati: I  TM distance: >3 FB  Neck ROM: full    Dental   Comment: Broken tooth on the upper right molars    Cardiovascular       Pulmonary                     Anesthesia Plan      History & Physical Review  History and physical reviewed and following examination; no interval change.    ASA Status:  2 .    NPO Status:  > 6 hours    Plan for General and LMA with Intravenous and Propofol induction. Maintenance will be Balanced.    PONV prophylaxis:  Ondansetron (or other 5HT-3) and Dexamethasone or Solumedrol       Postoperative Care  Postoperative pain management:  Multi-modal analgesia.      Consents  Anesthetic plan, risks, benefits and alternatives discussed with:  Patient.  Use of blood products discussed: No .   .        ANESTHESIA PREOP EVALUATION    Procedure: Procedure(s):  Removal Left Shoulder Tumor - Wound Class: I-Clean    HPI: Geronimo Arvizu is a 52 year old male presenting for above procedure.     PMHx/PSHx/ROS:  No past medical history  on file.    Past Surgical History:   Procedure Laterality Date     HC TOOTH EXTRACTION W/FORCEP           Past Anes Hx: No personal or family h/o anesthesia problems    Soc Hx:   Social History   Substance Use Topics     Smoking status: Former Smoker     Smokeless tobacco: Never Used      Comment: Quit in his early 30's.     Alcohol use Yes      Comment: Occ.        Allergies: No Known Allergies    Meds:     (Not in a hospital admission)    No current outpatient prescriptions on file.       Physical Exam:  Vitals: BP (!) 141/93  Pulse 63  Temp 36.2  C (97.2  F) (Temporal)  Resp 16  Ht 1.829 m (6')  Wt 111.1 kg (245 lb)  SpO2 95%  BMI 33.23 kg/m2  BMI= Body mass index is 33.23 kg/(m^2).      Labs:  UPT: No results found for: HCGQUANT      BMP:  Recent Labs   Lab Test  11/10/17   1407   CR  1.05     CBC:   Recent Labs   Lab Test  11/10/17   1407   WBC  8.5   RBC  5.39   HGB  15.8   HCT  47.7   MCV  89   MCH  29.3   MCHC  33.1   RDW  12.9   PLT  301     Coags:  No results for input(s): INR, PTT, FIBR in the last 30426 hours.    Assessment/Plan:  - ASA 2  - General with LMA with standard ASA monitors, IV induction, balanced anesthetic  - PIV  - Antibiotics per surgery  - PONV prophylaxis  - Relevant risks, benefits, alternatives and the anesthetic plan were discussed with patient/family or family representative.  All questions were answered and there was agreement to proceed.      Armando GARCIA-3, D.O.    11/16/2017  8:16 AM                    History and physical assessed; Patient examined. I have reviewed and agree with this pre-op assessment and anesthetic plan with addendums as necessary.     Risks and alternatives presented and discussed. Patient and family agree. All questions answered.      Win Brown MD  Staff Anesthesiologist  *[phone]

## 2017-11-16 NOTE — BRIEF OP NOTE
[facility]    Orthopaedic Surgery  Brief Operative Note    Pre-operative diagnosis: Tumor   Post-operative diagnosis Left shoulder mass   Procedure: Procedure(s):  Removal Left Shoulder Tumor - Wound Class: I-Clean   Surgeon: Callum Denson MD   Assistants(s): Sarah Whitten PA-C; Jaguar Sin MD   Anesthesia: General    Estimated blood loss: Less than 100 ml   Total IV fluids: (See anesthesia record)   Blood transfusion: (See anesthesia record)   Total urine output: (See anesthesia record)   Drains: None   Specimens:   ID Type Source Tests Collected by Time Destination   A : Left shoulder tumor Tissue Shoulder SURGICAL PATHOLOGY EXAM Callum Denson MD 11/16/2017 10:00 AM       Findings: None   Complications: None   Implants: None    Post-op Plan:  WB status: FWB avoid heavy lifting x 1 week  Device:  Sling prn for comfort  DVT Prophylaxis:  Not needed   Follow-up:  2 weeks with Dr. Denson/ANGELINA for wound check

## 2017-11-16 NOTE — IP AVS SNAPSHOT
[facility]    [address] [place] [address]    Phone:  [phone]    Fax:  [phone]                                       After Visit Summary   11/16/2017    Geronimo Arvizu    MRN: [id number]           After Visit Summary Signature Page     I have received my discharge instructions, and my questions have been answered. I have discussed any challenges I see with this plan with the nurse or doctor.    ..........................................................................................................................................  Patient/Patient Representative Signature      ..........................................................................................................................................  Patient Representative Print Name and Relationship to Patient    ..................................................               ................................................  Date                                            Time    ..........................................................................................................................................  Reviewed by Signature/Title    ...................................................              ..............................................  Date                                                            Time

## 2017-11-16 NOTE — IP AVS SNAPSHOT
MRN:[id number]                      After Visit Summary   11/16/2017    Geronimo Arvizu    MRN: [id number]           Thank you!     Thank you for choosing [facility] for your care. Our goal is always to provide you with excellent care. Hearing back from our patients is one way we can continue to improve our services. Please take a few minutes to complete the written survey that you may receive in the mail after you visit with us. Thank you!        Patient Information     Date Of Birth          1965        About your hospital stay     You were admitted on:  November 16, 2017 [facility]    You were discharged on:  November 16, 2017       Who to Call     For medical emergencies, please call 911.  For non-urgent questions about your medical care, please call your primary care provider or clinic, [phone]  For questions related to your surgery, please call your surgery clinic        Attending Provider     Provider Callum Chambers MD Orthopedics       Primary Care Provider Office Phone # Fax #    Jorge L Serra [phone]      After Care Instructions      Diet as Tolerated       Return to diet before surgery, unless instructed otherwise.            Discharge Instructions       Review outpatient procedure discharge instructions with patient as directed by Provider            Discharge Instructions - Lifting Limit (specify)       Lifting limit  2 pounds until seen at Post-op follow up appointment.            Dressing Change        IF leaking, remove and redress. Wash hands before and after and use gloves.            Ice to affected area       Ice pack to surgical site every 15 minutes per hour for 24 hours            No driving or operating machinery        until the day after procedure            Notify Provider       CALL YOUR PHYSICIAN IF:  1.  Your pain begins to worsen and is unable to be  controlled with your medications.  2.  Excessive redness or drainage of cloudy or bloody material from the wounds (Clear red tinted fluid and some mild drainage should be expected). Drainage of any kind 5 days after surgery should be reported to the doctor.  3.  You have a temperature elevation greater than 101.5    4.  You have pain, swelling or redness in your calf. You have numbness or weakness in your leg or foot.    You many call your physician at [phone] during business hours.    For after hours or on weekends, you may call the hospital at [phone] and ask for the orthopedic resident on call.            Remove dressing - at 72 hours        OK to remove dressing in 4-5 days.  Ok to shower and get incision wet at that time.  No soaking in a tub or pool for 2 weeks.            Return to clinic       Return to clinic in 2 weeks            Shower       Cover dressing if dressing is not going to be changed today.  OK to remove dressing in 4 days and shower and get incision wet.  No soaking in a tub or pool for 2 weeks.            Weight bearing - As tolerated                 Further instructions from your care team       [facility]  Home Care Following Anesthesia  For 24 hours after surgery:  1. Get plenty of rest.  A responsible adult must stay with you for at least 24 hours after you leave the surgery center.  2. Do not drive or use heavy equipment.  If you have weakness or tingling, don't drive or use heavy equipment until this feeling goes away.   3. Do not drink alcohol.   4. Avoid strenuous or risky activities.  Ask for help when climbing stairs.  5. You may feel lightheaded.  IF so, sit for a few minutes before standing.  Have someone help you get up.   6. If you have nausea (feel sick to your stomach): Drink only clear liquids such as apple juice, ginger ale, broth or 7-Up.  Rest may also help.  Be sure to drink enough fluids.  Move to a regular diet as you feel  able.   7. You may have a slight fever.  Call the doctor if your fever is over 100 F (37.7 C) (taken under the tongue) or lasts longer than 24 hours.  8. You may have a dry mouth, a sore throat, muscle aches or trouble sleeping. These should go away after 24 hours.  9. Do not make important or legal decisions.               Tips for taking pain medications  To get the best pain relief possible, remember these points:    Take pain medications as directed, before pain becomes severe.    Pain medication can upset your stomach: taking it with food may help.    Constipation is a common side effect of pain medication. Drink plenty of  fluids.    Eat foods high in fiber. Take a stool softener if recommended by your doctor or pharmacist.    Do not drink alcohol, drive or operate machinery while taking pain medications.    Ask about other ways to control pain, such as with heat, ice or relaxation.    Tylenol/Acetaminophen Consumption  To help encourage the safe use of acetaminophen, the makers of TYLENOL  have lowered the maximum daily dose for single-ingredient Extra Strength TYLENOL  (acetaminophen) products sold in the [place] from 8 pills per day (4,000 mg) to 6 pills per day (3,000 mg). The dosing interval has also changed from 2 pills every 4-6 hours to 2 pills every 6 hours.    If you feel your pain relief is insufficient, you may take Tylenol/Acetaminophen in addition to your narcotic pain medication.     Be careful not to exceed 3,000 mg of Tylenol/Acetaminophen in a 24 hour period from all sources.    If you are taking extra strength Tylenol/acetaminophen (500 mg), the maximum dose is 6 tablets in 24 hours.    If you are taking regular strength acetaminophen (325 mg), the maximum dose is 9 tablets in 24 hours.    Call a doctor for any of the followin. Signs of infection (fever, growing tenderness at the surgery site, a large amount of drainage or bleeding, severe pain, foul-smelling drainage, redness,  swelling).  2. It has been over 8 to 10 hours since surgery and you are still not able to urinate (pass water).  3. Headache for over 24 hours.  4. Numbness, tingling or weakness the day after surgery (if you had spinal anesthesia).  Your doctor is:  Dr. Callum Denson, Orthopaedics: [phone]                    Or dial [phone] and ask for the resident on call for:  Orthopaedics  For emergency care, call the:  [facility] Emergency Department: [phone] (TTY for hearing impaired: [phone])                Pending Results     Date and Time Order Name Status Description    11/16/2017 1000 Surgical pathology exam In process             Admission Information     Date & Time Provider Department Dept. Phone    11/16/2017 Callum Denson MD [facility] [phone]      Your Vitals Were     Blood Pressure Pulse Temperature Respirations Height Weight    135/87 71 97  F (36.1  C) (Temporal) 20 1.829 m (6') 111.1 kg (245 lb)    Pulse Oximetry BMI (Body Mass Index)                95% 33.23 kg/m2          [employer] Information     [employer] gives you secure access to your electronic health record. If you see a primary care provider, you can also send messages to your care team and make appointments. If you have questions, please call your primary care clinic.  If you do not have a primary care provider, please call [phone] and they will assist you.      [employer] is an electronic gateway that provides easy, online access to your medical records. With [employer], you can request a clinic appointment, read your test results, renew a prescription or communicate with your care team.     To access your existing account, please contact your [facility] or call [phone] for assistance.        Care EveryWhere ID     This is your Care EveryWhere ID. This could be used by other organizations to access your [facility] medical records  [id number]        Equal Access  to Services     [facility]: Mode Sun, wamike chauhan, qaybtrinh lawrence, kiana fan. So [facility] [phone].    ATENCIÓN: Si habla malika, tiene a tejeda disposición servicios gratuitos de asistencia lingüística. Llame al [phone].    We comply with applicable federal civil rights laws and [place] laws. We do not discriminate on the basis of race, color, national origin, age, disability, sex, sexual orientation, or gender identity.               Review of your medicines      START taking        Dose / Directions    oxyCODONE IR 5 MG tablet   Commonly known as:  ROXICODONE   Used for:  Intramuscular lipoma        Dose:  5-10 mg   Take 1-2 tablets (5-10 mg) by mouth every 4 hours as needed for pain or other (Moderate to Severe)   Quantity:  30 tablet   Refills:  0            Where to get your medicines      Some of these will need a paper prescription and others can be bought over the counter. Ask your nurse if you have questions.     Bring a paper prescription for each of these medications     oxyCODONE IR 5 MG tablet                Protect others around you: Learn how to safely use, store and throw away your medicines at www.disposemymeds.org.             Medication List: This is a list of all your medications and when to take them. Check marks below indicate your daily home schedule. Keep this list as a reference.      Medications           Morning Afternoon Evening Bedtime As Needed    oxyCODONE IR 5 MG tablet   Commonly known as:  ROXICODONE   Take 1-2 tablets (5-10 mg) by mouth every 4 hours as needed for pain or other (Moderate to Severe)

## 2017-11-23 LAB — COPATH REPORT: NORMAL

## 2017-12-01 ENCOUNTER — OFFICE VISIT (OUTPATIENT)
Dept: ORTHOPEDICS | Facility: CLINIC | Age: 52
End: 2017-12-01

## 2017-12-01 DIAGNOSIS — D17.9 INTRAMUSCULAR LIPOMA: Primary | ICD-10-CM

## 2017-12-01 NOTE — NURSING NOTE
Reason For Visit:   Chief Complaint   Patient presents with     Surgical Followup     Left shoulder large deep lipoma surrounding humeral head.  F/U.  DOS: 11/16/2017       Primary MD: Jorge L Serra  Ref. MD: CORY FOX      Age: 52 year old    ?  No      There were no vitals taken for this visit.      Pain Assessment  Patient Currently in Pain: Denies                   Current Outpatient Prescriptions   Medication Sig Dispense Refill     oxyCODONE IR (ROXICODONE) 5 MG tablet Take 1-2 tablets (5-10 mg) by mouth every 4 hours as needed for pain or other (Moderate to Severe) (Patient not taking: Reported on 12/1/2017) 30 tablet 0       No Known Allergies    Eri Santamaria, ATC

## 2017-12-01 NOTE — MR AVS SNAPSHOT
After Visit Summary   12/1/2017    Geronimo Arvizu    MRN: [id number]           Patient Information     Date Of Birth          1965        Visit Information        Provider Department      12/1/2017 12:45 PM Callum Denson MD [facility]        Today's Diagnoses     Intramuscular lipoma    -  1       Follow-ups after your visit        Who to contact     Please call your clinic at [phone] to:    Ask questions about your health    Make or cancel appointments    Discuss your medicines    Learn about your test results    Speak to your doctor   If you have compliments or concerns about an experience at your clinic, or if you wish to file a complaint, please contact [facility] Patient Relations at [phone] or email us at [email or web address]         Additional Information About Your Visit        MyChart Information     [employer] gives you secure access to your electronic health record. If you see a primary care provider, you can also send messages to your care team and make appointments. If you have questions, please call your primary care clinic.  If you do not have a primary care provider, please call [phone] and they will assist you.      [employer] is an electronic gateway that provides easy, online access to your medical records. With [employer], you can request a clinic appointment, read your test results, renew a prescription or communicate with your care team.     To access your existing account, please contact your [facility] or call [phone] for assistance.        Care EveryWhere ID     This is your Care EveryWhere ID. This could be used by other organizations to access your [facility] medical records  [id number]         Blood Pressure from Last 3 Encounters:   11/16/17 135/87   11/10/17 137/88   01/24/07 134/83    Weight from Last 3 Encounters:   11/16/17 111.1 kg (245 lb)   11/10/17 110.7 kg  (244 lb)   10/24/17 115.3 kg (254 lb 4.8 oz)              Today, you had the following     No orders found for display       Primary Care Provider Office Phone # Fax #    Jorge L Serra [phone] [address] [facility] [address]        Equal Access to Services     JASON DE LA CRUZ : Hadii aad ku hadasho Soomaali, waaxda luqadaha, qaybta kaalmada adeegyada, waxay idiin hayaan adeeg kharash laantionetten . So [facility] [phone].    ATENCIÓN: Si habla español, tiene a tejeda disposición servicios gratuitos de asistencia lingüística. Llame al [phone].    We comply with applicable federal civil rights laws and [place] laws. We do not discriminate on the basis of race, color, national origin, age, disability, sex, sexual orientation, or gender identity.            Thank you!     Thank you for choosing [facility]  for your care. Our goal is always to provide you with excellent care. Hearing back from our patients is one way we can continue to improve our services. Please take a few minutes to complete the written survey that you may receive in the mail after your visit with us. Thank you!             Your Updated Medication List - Protect others around you: Learn how to safely use, store and throw away your medicines at www.disposemymeds.org.          This list is accurate as of: 12/1/17  2:58 PM.  Always use your most recent med list.                   Brand Name Dispense Instructions for use Diagnosis    oxyCODONE IR 5 MG tablet    ROXICODONE    30 tablet    Take 1-2 tablets (5-10 mg) by mouth every 4 hours as needed for pain or other (Moderate to Severe)    Intramuscular lipoma

## 2017-12-01 NOTE — LETTER
12/1/2017       RE: Geronimo Arvizu  [address]  [place] [address]     Dear Colleague,    Thank you for referring your patient, Geronimo Arvizu, to the [facility] at [facility]. Please see a copy of my visit note below.    Diagnosis: Intramuscular lipoma left shoulder    Treatment: Surgical excision November 2017    Geronimo is seen back today in follow-up. Doing well. Reports no problems.    Physical exam reveals a healed incision and normal left shoulder motion.    I discussed his pathology with him when she is abdomen intramuscular lipoma. We discussed the possibility of tumor recurrence and the need to seek follow-up if he believes this is the case.    Impression: Doing well    Plan: Follow-up p.r.n.    Again, thank you for allowing me to participate in the care of your patient.      Sincerely,    Callum Denson MD

## 2017-12-01 NOTE — PROGRESS NOTES
Diagnosis: Intramuscular lipoma left shoulder    Treatment: Surgical excision November 2017    Geronimo is seen back today in follow-up. Doing well. Reports no problems.    Physical exam reveals a healed incision and normal left shoulder motion.    I discussed his pathology with him when she is abdomen intramuscular lipoma. We discussed the possibility of tumor recurrence and the need to seek follow-up if he believes this is the case.    Impression: Doing well    Plan: Follow-up p.r.n.

## 2018-02-22 ENCOUNTER — TELEPHONE (OUTPATIENT)
Dept: FAMILY MEDICINE | Facility: CLINIC | Age: 53
End: 2018-02-22

## 2018-02-22 NOTE — TELEPHONE ENCOUNTER
Patient is due for colonoscopy.  Was ordered and prep given at visit, 11/10/17.    Will call to remind patient to complete.    Left message for patient to return call.

## 2018-03-01 NOTE — TELEPHONE ENCOUNTER
Panel Management Review      Patient has the following on his problem list: None      Composite cancer screening  Chart review shows that this patient is due/due soon for the following Colonoscopy  Summary:    Patient is due/failing the following:   COLONOSCOPY    Action needed:   Due for colonoscopy     Type of outreach:    Spoke with patient, reminded him. He still has prep and scheduling info. He will schedule when able.     Questions for provider review:    None                                                                                                                                    Bennett FERMIN CMA

## 2018-07-26 ENCOUNTER — TELEPHONE (OUTPATIENT)
Dept: FAMILY MEDICINE | Facility: CLINIC | Age: 53
End: 2018-07-26

## 2018-07-26 NOTE — TELEPHONE ENCOUNTER
Patient is due for colon cancer screening.  Colonoscopy ordered and prep given at 11/10/17 office visit.  Reminded to complete 3/1/18.      Panel Management Review      Patient has the following on his problem list:       Composite cancer screening  Chart review shows that this patient is due/due soon for the following Colonoscopy  Summary:    Patient is due/failing the following:   COLONOSCOPY    Action needed:   colonoscopy    Type of outreach:    Phone, spoke to patient.  He is planning on trying to get this done in Sept or Oct.  States he still has prep info.    Questions for provider review:    None                                                                                                                                    ALEJANDRA Murcia MA

## 2019-11-07 ENCOUNTER — HEALTH MAINTENANCE LETTER (OUTPATIENT)
Age: 54
End: 2019-11-07

## 2019-11-20 NOTE — OP NOTE
Preoperative diagnosis: Intramuscular lipoma left shoulder    Postoperative diagnosis: Same    Procedure performed: Removal of intramuscular lipoma, deep, 15 cm left shoulder    Surgeon; david Ware, [facility]    Estimated blood loss: 100 cc    Pathology submitted: Tumor left shoulder formalin    Patient was interviewed in the preoperative area risk and benefits were reviewed consent was signed the surgical site was marked with my initials and alignment incision. Preoperative briefing had been performed. He was taken the operating room and received a general anesthetic. In the lateral position left shoulder was prepped and draped sterilely. Surgical timeout was performed.    5 inch incision was made in the anterior aspect of left shoulder. Dissection was taken down along the medial border of the deltoid muscle. The muscle was retracted laterally. A combination of blunt, scissor and cautery dissection was utilized then to free the tumor from its deep location adjacent to the humerus and in the posterior shoulder. Tumor was lifted from the wound. The wound was irrigated and hemostasis was obtained. At the time of wound closure they're worse some small areas of venous bleeding the most posterior aspect of the shoulder. This region was packed with Gelfoam. The wound was then closed with intramuscular subcutaneous and skin sutures. Specimen was submitted for final pathology.    Postoperative plan: 1. Sling for comfort. No heavy use of the arm for a minimum of 1 week. 2. Follow-up in 2 weeks for wound inspection review of histopathology.  
20-Nov-2019 20:38:38

## 2019-11-22 ENCOUNTER — OFFICE VISIT (OUTPATIENT)
Dept: FAMILY MEDICINE | Facility: CLINIC | Age: 54
End: 2019-11-22
Payer: COMMERCIAL

## 2019-11-22 VITALS
SYSTOLIC BLOOD PRESSURE: 130 MMHG | WEIGHT: 208.3 LBS | HEART RATE: 71 BPM | HEIGHT: 71 IN | DIASTOLIC BLOOD PRESSURE: 82 MMHG | BODY MASS INDEX: 29.16 KG/M2 | TEMPERATURE: 96.2 F | OXYGEN SATURATION: 98 %

## 2019-11-22 DIAGNOSIS — Z12.11 SPECIAL SCREENING FOR MALIGNANT NEOPLASMS, COLON: ICD-10-CM

## 2019-11-22 DIAGNOSIS — K40.90 NON-RECURRENT UNILATERAL INGUINAL HERNIA WITHOUT OBSTRUCTION OR GANGRENE: Primary | ICD-10-CM

## 2019-11-22 DIAGNOSIS — Z23 NEED FOR PROPHYLACTIC VACCINATION AND INOCULATION AGAINST INFLUENZA: ICD-10-CM

## 2019-11-22 DIAGNOSIS — Z23 NEED FOR VACCINATION: ICD-10-CM

## 2019-11-22 LAB
ALBUMIN SERPL-MCNC: 3.6 G/DL (ref 3.4–5)
ALBUMIN UR-MCNC: NEGATIVE MG/DL
ALP SERPL-CCNC: 56 U/L (ref 40–150)
ALT SERPL W P-5'-P-CCNC: 35 U/L (ref 0–70)
ANION GAP SERPL CALCULATED.3IONS-SCNC: 3 MMOL/L (ref 3–14)
APPEARANCE UR: CLEAR
AST SERPL W P-5'-P-CCNC: 27 U/L (ref 0–45)
BILIRUB SERPL-MCNC: 0.7 MG/DL (ref 0.2–1.3)
BILIRUB UR QL STRIP: NEGATIVE
BUN SERPL-MCNC: 16 MG/DL (ref 7–30)
CALCIUM SERPL-MCNC: 9.1 MG/DL (ref 8.5–10.1)
CHLORIDE SERPL-SCNC: 104 MMOL/L (ref 94–109)
CO2 SERPL-SCNC: 31 MMOL/L (ref 20–32)
COLOR UR AUTO: YELLOW
CREAT SERPL-MCNC: 0.91 MG/DL (ref 0.66–1.25)
GFR SERPL CREATININE-BSD FRML MDRD: >90 ML/MIN/{1.73_M2}
GLUCOSE SERPL-MCNC: 103 MG/DL (ref 70–99)
GLUCOSE UR STRIP-MCNC: NEGATIVE MG/DL
HGB UR QL STRIP: ABNORMAL
KETONES UR STRIP-MCNC: NEGATIVE MG/DL
LEUKOCYTE ESTERASE UR QL STRIP: NEGATIVE
NITRATE UR QL: NEGATIVE
PH UR STRIP: 6 PH (ref 5–7)
POTASSIUM SERPL-SCNC: 4.1 MMOL/L (ref 3.4–5.3)
PROT SERPL-MCNC: 7.8 G/DL (ref 6.8–8.8)
RBC #/AREA URNS AUTO: ABNORMAL /HPF
SODIUM SERPL-SCNC: 138 MMOL/L (ref 133–144)
SOURCE: ABNORMAL
SP GR UR STRIP: 1.02 (ref 1–1.03)
UROBILINOGEN UR STRIP-ACNC: 1 EU/DL (ref 0.2–1)
WBC #/AREA URNS AUTO: ABNORMAL /HPF

## 2019-11-22 PROCEDURE — 80053 COMPREHEN METABOLIC PANEL: CPT | Performed by: NURSE PRACTITIONER

## 2019-11-22 PROCEDURE — 90682 RIV4 VACC RECOMBINANT DNA IM: CPT | Performed by: NURSE PRACTITIONER

## 2019-11-22 PROCEDURE — 90471 IMMUNIZATION ADMIN: CPT | Performed by: NURSE PRACTITIONER

## 2019-11-22 PROCEDURE — 99203 OFFICE O/P NEW LOW 30 MIN: CPT | Mod: 25 | Performed by: NURSE PRACTITIONER

## 2019-11-22 PROCEDURE — 90715 TDAP VACCINE 7 YRS/> IM: CPT | Performed by: NURSE PRACTITIONER

## 2019-11-22 PROCEDURE — 36415 COLL VENOUS BLD VENIPUNCTURE: CPT | Performed by: NURSE PRACTITIONER

## 2019-11-22 PROCEDURE — 90472 IMMUNIZATION ADMIN EACH ADD: CPT | Performed by: NURSE PRACTITIONER

## 2019-11-22 PROCEDURE — 81001 URINALYSIS AUTO W/SCOPE: CPT | Performed by: NURSE PRACTITIONER

## 2019-11-22 ASSESSMENT — MIFFLIN-ST. JEOR: SCORE: 1810.93

## 2019-11-22 NOTE — LETTER
November 22, 2019      Geronimo Arvizu  [address] [facility] [address]        Dear ,    We are writing to inform you of your test results.    All labs are normal.   Normal UA, kidney, liver function.     Resulted Orders   Comprehensive metabolic panel (BMP + Alb, Alk Phos, ALT, AST, Total. Bili, TP)   Result Value Ref Range    Sodium 138 133 - 144 mmol/L    Potassium 4.1 3.4 - 5.3 mmol/L    Chloride 104 94 - 109 mmol/L    Carbon Dioxide 31 20 - 32 mmol/L    Anion Gap 3 3 - 14 mmol/L    Glucose 103 (H) 70 - 99 mg/dL    Urea Nitrogen 16 7 - 30 mg/dL    Creatinine 0.91 0.66 - 1.25 mg/dL    GFR Estimate >90 >60 mL/min/[1.73_m2]      Comment:      Non  GFR Calc  Starting 12/18/2018, serum creatinine based estimated GFR (eGFR) will be   calculated using the Chronic Kidney Disease Epidemiology Collaboration   (CKD-EPI) equation.      GFR Estimate If Black >90 >60 mL/min/[1.73_m2]      Comment:       GFR Calc  Starting 12/18/2018, serum creatinine based estimated GFR (eGFR) will be   calculated using the Chronic Kidney Disease Epidemiology Collaboration   (CKD-EPI) equation.      Calcium 9.1 8.5 - 10.1 mg/dL    Bilirubin Total 0.7 0.2 - 1.3 mg/dL    Albumin 3.6 3.4 - 5.0 g/dL    Protein Total 7.8 6.8 - 8.8 g/dL    Alkaline Phosphatase 56 40 - 150 U/L    ALT 35 0 - 70 U/L    AST 27 0 - 45 U/L   UA with Microscopic reflex to Culture   Result Value Ref Range    Color Urine Yellow     Appearance Urine Clear     Glucose Urine Negative NEG^Negative mg/dL    Bilirubin Urine Negative NEG^Negative    Ketones Urine Negative NEG^Negative mg/dL    Specific Gravity Urine 1.020 1.003 - 1.035    pH Urine 6.0 5.0 - 7.0 pH    Protein Albumin Urine Negative NEG^Negative mg/dL    Urobilinogen Urine 1.0 0.2 - 1.0 EU/dL    Nitrite Urine Negative NEG^Negative    Blood Urine Trace (A) NEG^Negative    Leukocyte Esterase Urine Negative NEG^Negative    Source Midstream Urine     WBC Urine 0 - 5  OTO5^0 - 5 /HPF    RBC Urine O - 2 OTO2^O - 2 /HPF       If you have any questions or concerns, please call the clinic at the number listed above.       Sincerely,        TIFFANY Guerra CNP

## 2019-11-22 NOTE — PROGRESS NOTES
"Subjective     Geronimo Arvizu is a 54 year old male who presents to clinic today for the following health issues: complains of bulging and intermittent pain in the left groin area, started about a month ago after he rolled/lifted a piece of wood that weighted about 200 lbs. 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  The patient also reports that he noted about a week ago that his urine looks darker in color. He reports he drinks enough fluids and denies alcohol use.     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     Therapies tried and outcome: nothing      Reviewed and updated as needed this visit by Provider         Review of Systems   ROS COMP: Constitutional, HEENT, cardiovascular, pulmonary, gi and gu systems are negative, except as otherwise noted.      Objective    /82 (BP Location: Right arm, Patient Position: Sitting, Cuff Size: Adult Regular)   Pulse 71   Temp 96.2  F (35.7  C) (Tympanic)   Ht 1.81 m (5' 11.25\")   Wt 94.5 kg (208 lb 4.8 oz)   SpO2 98%   BMI 28.85 kg/m    Body mass index is 28.85 kg/m .  Physical Exam   GENERAL: healthy, alert and no distress  ABDOMEN: soft, nontender and left inguinal hernia, non-tender to palpation   SKIN: no suspicious lesions or rashes  PSYCH: mentation appears normal, affect normal/bright    Diagnostic Test Results:  Labs reviewed in [employer]    labs pending     Assessment & Plan     1. Non-recurrent unilateral inguinal hernia without obstruction or gangrene  -follow up with " general surgeon for consult about surgical repair options   -avoid heavy lifting   - GENERAL SURG ADULT REFERRAL  - Comprehensive metabolic panel (BMP + Alb, Alk Phos, ALT, AST, Total. Bili, TP)  - UA with Microscopic reflex to Culture    2. Need for prophylactic vaccination and inoculation against influenza    - INFLUENZA QUAD, RECOMBINANT, P-FREE (RIV4) (FLUBLOCK) [41258]  - Vaccine Administration, Initial [59724]    3. Need for vaccination    - TDAP VACCINE (ADACEL) [18419.002]  - Each additional admin.  (Right click and add QUANTITY)  [29772]    4. Special screening for malignant neoplasms, colon    - GASTROENTEROLOGY ADULT REF PROCEDURE ONLY       See Patient Instructions    Return in about 1 week (around 11/29/2019) for general surgery for consult .    TIFFANY Guerra [facility]

## 2019-12-04 ENCOUNTER — OFFICE VISIT (OUTPATIENT)
Dept: SURGERY | Facility: CLINIC | Age: 54
End: 2019-12-04
Payer: COMMERCIAL

## 2019-12-04 VITALS
TEMPERATURE: 98.4 F | BODY MASS INDEX: 29.17 KG/M2 | DIASTOLIC BLOOD PRESSURE: 85 MMHG | WEIGHT: 208.34 LBS | HEART RATE: 68 BPM | SYSTOLIC BLOOD PRESSURE: 137 MMHG | HEIGHT: 71 IN

## 2019-12-04 DIAGNOSIS — K40.20 NON-RECURRENT BILATERAL INGUINAL HERNIA WITHOUT OBSTRUCTION OR GANGRENE: Primary | ICD-10-CM

## 2019-12-04 PROCEDURE — 99204 OFFICE O/P NEW MOD 45 MIN: CPT | Performed by: SURGERY

## 2019-12-04 ASSESSMENT — MIFFLIN-ST. JEOR: SCORE: 1811.09

## 2019-12-04 NOTE — PROGRESS NOTES
Patient referred by primary care provider for evaluation of left groin mass.    54-year-old male complaining of left groin mass for the past month.  Patient is a  and does a lot of heavy lifting at his work.  He reports he noticed the mass after doing some heavy lifting but did not feel an obvious pop or inciting event.  The mass is reducible most days.  It causes localized discomfort 1 out of 10 with some radiation down to the left testicle.  No other associated symptoms.    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 No pertinent past medical history.    Past Surgical History:   Procedure Laterality Date     HC TOOTH EXTRACTION W/FORCEP       RESECT TUMOR UPPER EXTREMITY Left 11/16/2017    Procedure: RESECT TUMOR UPPER EXTREMITY;  Removal Left Shoulder Tumor;  Surgeon: Callum Denson MD;  Location: [facility] OR       Family History   Problem Relation Age of Onset     Alzheimer Disease Mother      Cirrhosis Father      Other - See Comments Sister         Uterine polyps       Social History     Tobacco Use     Smoking status: Former Smoker     Smokeless tobacco: Never Used     Tobacco comment: Quit in his early 30's.   Substance Use Topics     Alcohol use: Yes     Comment: Occ.         History   Drug Use No       No current outpatient medications on file.       No Known Allergies     BMP  Recent Labs   Lab Test 11/22/19  0804      POTASSIUM 4.1   JOCELYN 9.1   CHLORIDE 104   CO2 31   BUN 16   CR 0.91   *       LFTs  Recent Labs   Lab Test 11/22/19  0804   PROTTOTAL 7.8   ALBUMIN 3.6   BILITOTAL 0.7   ALKPHOS 56   AST 27   ALT 35     ROS  Constitutional - Denies fevers, weight loss, malaise, lethargy  Neuro - Denies tremors or seizures  Pulmon - Denies SOB, dyspnea, hemoptysis, chronic cough or use of an inhaler  CV -  "Denies CP, SOB, lower extremity edema, difficulty w/ stairs, has never used NTG  GI - Denies hematemesis, BRBPR, melena, chronic diarrhea or epigastric pain   - Denies hematuria, difficulty voiding, h/o STDs  Hematology - Denies blood clotting disorders, chronic anemias  Dermatology - No melanomas or skin cancers  Rheumatology - No h/o RA  Pysch - Denies depression, bipolar d/o or schizophrenia    Exam:/85 (BP Location: Left arm, Patient Position: Sitting, Cuff Size: Adult Regular)   Pulse 68   Temp 98.4  F (36.9  C) (Oral)   Ht 1.81 m (5' 11.25\")   Wt 94.5 kg (208 lb 5.4 oz)   BMI 28.85 kg/m      General - Alert and Oriented X4, NAD, well nourished  HEENT - Normocephalic, atraumatic, PERRLA, Nose midline, Throat without lesions  Neck - supple, no LAD, Carotids without bruits  Lungs - Clear to auscultation bilaterally with good inspiratory effort, no tactile fremitus  CV - Heart RRR, no lift's, thrills, murmurs, rubs, or gallops. Carotid, radial, and femoral pulses 2+ bilaterally  Abdomen - Soft, non-tender, +BS, no hepatosplenomegaly, no palpable masses  Groins - 2+ pulses bilaterally and no LAD, palpable reducible masses bilaterally  Neuro - Full ROM, Strength 5/5 and major muscle groups, sensation intact  Extremities - No cyanosis, clubbing or edema    Assessment and plan: 54-year-old male with bilateral inguinal hernias.  Patient is a good candidate for laparoscopic repair.  Risks benefits alternatives and complications were discussed with the patient including the possibility of infection bleeding or hernia recurrence.  Patient understood and wished to proceed.  Patient will call when he is ready to schedule.  PATIENT IS CLEARED FOR SURGERY.    Kevin Hadley MD   "

## 2019-12-04 NOTE — NURSING NOTE
"Initial /85 (BP Location: Left arm, Patient Position: Sitting, Cuff Size: Adult Regular)   Pulse 68   Temp 98.4  F (36.9  C) (Oral)   Ht 1.81 m (5' 11.25\")   Wt 94.5 kg (208 lb 5.4 oz)   BMI 28.85 kg/m   Estimated body mass index is 28.85 kg/m  as calculated from the following:    Height as of this encounter: 1.81 m (5' 11.25\").    Weight as of this encounter: 94.5 kg (208 lb 5.4 oz). .    Leilani Fabian MA    "

## 2019-12-04 NOTE — LETTER
12/4/2019         RE: Geronimo Arvizu  [address]  [facility] [address]        Dear Colleague,    Thank you for referring your patient, Geronimo Arvizu, to the [facility]. Please see a copy of my visit note below.    Patient referred by primary care provider for evaluation of left groin mass.    54-year-old male complaining of left groin mass for the past month.  Patient is a  and does a lot of heavy lifting at his work.  He reports he noticed the mass after doing some heavy lifting but did not feel an obvious pop or inciting event.  The mass is reducible most days.  It causes localized discomfort 1 out of 10 with some radiation down to the left testicle.  No other associated symptoms.    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 No pertinent past medical history.    Past Surgical History:   Procedure Laterality Date     HC TOOTH EXTRACTION W/FORCEP       RESECT TUMOR UPPER EXTREMITY Left 11/16/2017    Procedure: RESECT TUMOR UPPER EXTREMITY;  Removal Left Shoulder Tumor;  Surgeon: Callum Denson MD;  Location: [facility] OR       Family History   Problem Relation Age of Onset     Alzheimer Disease Mother      Cirrhosis Father      Other - See Comments Sister         Uterine polyps       Social History     Tobacco Use     Smoking status: Former Smoker     Smokeless tobacco: Never Used     Tobacco comment: Quit in his early 30's.   Substance Use Topics     Alcohol use: Yes     Comment: Occ.         History   Drug Use No       No current outpatient medications on file.       No Known Allergies     BMP  Recent Labs   Lab Test 11/22/19  0804      POTASSIUM 4.1   JOCELYN 9.1   CHLORIDE 104   CO2 31   BUN 16   CR 0.91   *       LFTs  Recent Labs   Lab Test 11/22/19  0804   PROTTOTAL 7.8  "  ALBUMIN 3.6   BILITOTAL 0.7   ALKPHOS 56   AST 27   ALT 35     ROS  Constitutional - Denies fevers, weight loss, malaise, lethargy  Neuro - Denies tremors or seizures  Pulmon - Denies SOB, dyspnea, hemoptysis, chronic cough or use of an inhaler  CV - Denies CP, SOB, lower extremity edema, difficulty w/ stairs, has never used NTG  GI - Denies hematemesis, BRBPR, melena, chronic diarrhea or epigastric pain   - Denies hematuria, difficulty voiding, h/o STDs  Hematology - Denies blood clotting disorders, chronic anemias  Dermatology - No melanomas or skin cancers  Rheumatology - No h/o RA  Pysch - Denies depression, bipolar d/o or schizophrenia    Exam:/85 (BP Location: Left arm, Patient Position: Sitting, Cuff Size: Adult Regular)   Pulse 68   Temp 98.4  F (36.9  C) (Oral)   Ht 1.81 m (5' 11.25\")   Wt 94.5 kg (208 lb 5.4 oz)   BMI 28.85 kg/m       General - Alert and Oriented X4, NAD, well nourished  HEENT - Normocephalic, atraumatic, PERRLA, Nose midline, Throat without lesions  Neck - supple, no LAD, Carotids without bruits  Lungs - Clear to auscultation bilaterally with good inspiratory effort, no tactile fremitus  CV - Heart RRR, no lift's, thrills, murmurs, rubs, or gallops. Carotid, radial, and femoral pulses 2+ bilaterally  Abdomen - Soft, non-tender, +BS, no hepatosplenomegaly, no palpable masses  Groins - 2+ pulses bilaterally and no LAD, palpable reducible masses bilaterally  Neuro - Full ROM, Strength 5/5 and major muscle groups, sensation intact  Extremities - No cyanosis, clubbing or edema    Assessment and plan: 54-year-old male with bilateral inguinal hernias.  Patient is a good candidate for laparoscopic repair.  Risks benefits alternatives and complications were discussed with the patient including the possibility of infection bleeding or hernia recurrence.  Patient understood and wished to proceed.  Patient will call when he is ready to schedule.  PATIENT IS CLEARED FOR " SURGERY.    Kevin Hadley MD     Again, thank you for allowing me to participate in the care of your patient.        Sincerely,        Kevin Hadley MD

## 2019-12-09 ENCOUNTER — TELEPHONE (OUTPATIENT)
Dept: SURGERY | Facility: CLINIC | Age: 54
End: 2019-12-09

## 2019-12-09 PROBLEM — K40.20 NON-RECURRENT BILATERAL INGUINAL HERNIA WITHOUT OBSTRUCTION OR GANGRENE: Status: ACTIVE | Noted: 2019-12-09

## 2019-12-09 NOTE — TELEPHONE ENCOUNTER
Called and spoke to patient, surgery has been scheduled for 12/17/19 @11:30am. Home prep info reviewed with patient and no questions noted.    Rachelle LAMAS MA

## 2019-12-09 NOTE — TELEPHONE ENCOUNTER
Reason for Call:  Other     Detailed comments: Pt calling to schedule hernia surgery - Would like to schedule before the end of the year if possible    Pt also needs to know if needs a preop physical (says provider did not think it was necessary)    Phone Number Patient can be reached at: Home number on file [phone] (home)    Best Time: Any    Can we leave a detailed message on this number? YES    Call taken on 12/9/2019 at 1:45 PM by Denise Behrendt

## 2019-12-16 ENCOUNTER — ANESTHESIA EVENT (OUTPATIENT)
Dept: SURGERY | Facility: CLINIC | Age: 54
End: 2019-12-16
Payer: COMMERCIAL

## 2019-12-16 ASSESSMENT — LIFESTYLE VARIABLES: TOBACCO_USE: 1

## 2019-12-16 NOTE — ANESTHESIA PREPROCEDURE EVALUATION
Anesthesia Pre-Procedure Evaluation    Patient: Geronimo Arvizu   MRN: [id number] : 1965          Preoperative Diagnosis: Non-recurrent bilateral inguinal hernia without obstruction or gangrene [K40.20]    Procedure(s):  HERNIORRHAPHY, INGUINAL, BILATERAL, LAPAROSCOPIC    No past medical history on file.  Past Surgical History:   Procedure Laterality Date     HC TOOTH EXTRACTION W/FORCEP       RESECT TUMOR UPPER EXTREMITY Left 2017    Procedure: RESECT TUMOR UPPER EXTREMITY;  Removal Left Shoulder Tumor;  Surgeon: Callum Denson MD;  Location: [facility] OR       Anesthesia Evaluation     . Pt has had prior anesthetic. Type: General and MAC    No history of anesthetic complications          ROS/MED HX    ENT/Pulmonary:     (+)tobacco use, Past use , . .    Neurologic:  - neg neurologic ROS     Cardiovascular:  - neg cardiovascular ROS   (+) ----. : . . . :. . Previous cardiac testing date:results:date: results:ECG reviewed date:11/10/17 results:SR date: results:          METS/Exercise Tolerance:  >4 METS   Hematologic:  - neg hematologic  ROS       Musculoskeletal: Comment: Recurrent shoulder dislocation        GI/Hepatic: Comment: Bilateral inguinal hernia  Hx cirrhosis        Renal/Genitourinary:  - ROS Renal section negative       Endo:  - neg endo ROS       Psychiatric:  - neg psychiatric ROS       Infectious Disease:  - neg infectious disease ROS       Malignancy:   (+) Malignancy History of Lymphoma/Leukemia          Other:    - neg other ROS                      Physical Exam  Normal systems: cardiovascular, pulmonary and dental    Airway   Mallampati: II  TM distance: >3 FB  Neck ROM: full    Dental     Cardiovascular   Rhythm and rate: regular and normal      Pulmonary    breath sounds clear to auscultation            Lab Results   Component Value Date    WBC 8.5 11/10/2017    HGB 15.8 11/10/2017    HCT 47.7 11/10/2017     11/10/2017     2019    POTASSIUM 4.1 2019     "CHLORIDE 104 11/22/2019    CO2 31 11/22/2019    BUN 16 11/22/2019    CR 0.91 11/22/2019     (H) 11/22/2019    JOCELYN 9.1 11/22/2019    ALBUMIN 3.6 11/22/2019    PROTTOTAL 7.8 11/22/2019    ALT 35 11/22/2019    AST 27 11/22/2019    ALKPHOS 56 11/22/2019    BILITOTAL 0.7 11/22/2019       Preop Vitals  BP Readings from Last 3 Encounters:   12/04/19 137/85   11/22/19 130/82   11/16/17 135/87    Pulse Readings from Last 3 Encounters:   12/04/19 68   11/22/19 71   11/16/17 71      Resp Readings from Last 3 Encounters:   11/16/17 20    SpO2 Readings from Last 3 Encounters:   11/22/19 98%   11/16/17 95%      Temp Readings from Last 1 Encounters:   12/04/19 36.9  C (98.4  F) (Oral)    Ht Readings from Last 1 Encounters:   12/04/19 1.81 m (5' 11.25\")      Wt Readings from Last 1 Encounters:   12/04/19 94.5 kg (208 lb 5.4 oz)    Estimated body mass index is 28.85 kg/m  as calculated from the following:    Height as of 12/4/19: 1.81 m (5' 11.25\").    Weight as of 12/4/19: 94.5 kg (208 lb 5.4 oz).       Anesthesia Plan      History & Physical Review  History and physical reviewed and following examination; no interval change.    ASA Status:  2 .    NPO Status:  > 8 hours    Plan for General and ETT with Intravenous and Propofol induction. Maintenance will be Balanced.    PONV prophylaxis:  Ondansetron (or other 5HT-3) and Dexamethasone or Solumedrol  Additional equipment: Videolaryngoscope      Postoperative Care  Postoperative pain management:  IV analgesics and Oral pain medications.      Consents  Anesthetic plan, risks, benefits and alternatives discussed with:  Patient..                 TIFFANY Prescott CRNA  "

## 2019-12-17 ENCOUNTER — ANESTHESIA (OUTPATIENT)
Dept: SURGERY | Facility: CLINIC | Age: 54
End: 2019-12-17
Payer: COMMERCIAL

## 2019-12-17 ENCOUNTER — HOSPITAL ENCOUNTER (OUTPATIENT)
Facility: CLINIC | Age: 54
Discharge: HOME | End: 2019-12-17
Attending: SURGERY | Admitting: SURGERY
Payer: COMMERCIAL

## 2019-12-17 VITALS
OXYGEN SATURATION: 96 % | SYSTOLIC BLOOD PRESSURE: 112 MMHG | TEMPERATURE: 97.6 F | WEIGHT: 208 LBS | RESPIRATION RATE: 16 BRPM | HEART RATE: 65 BPM | BODY MASS INDEX: 29.12 KG/M2 | DIASTOLIC BLOOD PRESSURE: 74 MMHG | HEIGHT: 71 IN

## 2019-12-17 DIAGNOSIS — K40.20 NON-RECURRENT BILATERAL INGUINAL HERNIA WITHOUT OBSTRUCTION OR GANGRENE: ICD-10-CM

## 2019-12-17 DIAGNOSIS — G89.18 POSTOPERATIVE PAIN: Primary | ICD-10-CM

## 2019-12-17 PROCEDURE — 25000566 ZZH SEVOFLURANE, EA 15 MIN: Performed by: SURGERY

## 2019-12-17 PROCEDURE — 27110028 ZZH OR GENERAL SUPPLY NON-STERILE: Performed by: SURGERY

## 2019-12-17 PROCEDURE — 71000027 ZZH RECOVERY PHASE 2 EACH 15 MINS: Performed by: SURGERY

## 2019-12-17 PROCEDURE — 36000058 ZZH SURGERY LEVEL 3 EA 15 ADDTL MIN: Performed by: SURGERY

## 2019-12-17 PROCEDURE — C1781 MESH (IMPLANTABLE): HCPCS | Performed by: SURGERY

## 2019-12-17 PROCEDURE — 40000305 ZZH STATISTIC PRE PROC ASSESS I: Performed by: SURGERY

## 2019-12-17 PROCEDURE — 37000009 ZZH ANESTHESIA TECHNICAL FEE, EACH ADDTL 15 MIN: Performed by: SURGERY

## 2019-12-17 PROCEDURE — 25000125 ZZHC RX 250: Performed by: NURSE ANESTHETIST, CERTIFIED REGISTERED

## 2019-12-17 PROCEDURE — 49650 LAP ING HERNIA REPAIR INIT: CPT | Mod: 50 | Performed by: SURGERY

## 2019-12-17 PROCEDURE — 37000008 ZZH ANESTHESIA TECHNICAL FEE, 1ST 30 MIN: Performed by: SURGERY

## 2019-12-17 PROCEDURE — 25000128 H RX IP 250 OP 636: Performed by: NURSE ANESTHETIST, CERTIFIED REGISTERED

## 2019-12-17 PROCEDURE — 25000125 ZZHC RX 250: Performed by: SURGERY

## 2019-12-17 PROCEDURE — 25000132 ZZH RX MED GY IP 250 OP 250 PS 637: Performed by: NURSE ANESTHETIST, CERTIFIED REGISTERED

## 2019-12-17 PROCEDURE — 25000132 ZZH RX MED GY IP 250 OP 250 PS 637: Performed by: SURGERY

## 2019-12-17 PROCEDURE — 71000012 ZZH RECOVERY PHASE 1 LEVEL 1 FIRST HR: Performed by: SURGERY

## 2019-12-17 PROCEDURE — 49650 LAP ING HERNIA REPAIR INIT: CPT | Mod: 50 | Performed by: PHYSICIAN ASSISTANT

## 2019-12-17 PROCEDURE — 27210794 ZZH OR GENERAL SUPPLY STERILE: Performed by: SURGERY

## 2019-12-17 PROCEDURE — 36000056 ZZH SURGERY LEVEL 3 1ST 30 MIN: Performed by: SURGERY

## 2019-12-17 PROCEDURE — 25000128 H RX IP 250 OP 636: Performed by: SURGERY

## 2019-12-17 PROCEDURE — 25800030 ZZH RX IP 258 OP 636: Performed by: NURSE ANESTHETIST, CERTIFIED REGISTERED

## 2019-12-17 DEVICE — MESH PROGRIP LAPAROSCOPIC 5.9X3.9" PARIETEX SELF-FIX LPG1510: Type: IMPLANTABLE DEVICE | Site: ABDOMEN | Status: FUNCTIONAL

## 2019-12-17 RX ORDER — LIDOCAINE HYDROCHLORIDE 10 MG/ML
INJECTION, SOLUTION INFILTRATION; PERINEURAL PRN
Status: DISCONTINUED | OUTPATIENT
Start: 2019-12-17 | End: 2019-12-17

## 2019-12-17 RX ORDER — SODIUM CHLORIDE, SODIUM LACTATE, POTASSIUM CHLORIDE, CALCIUM CHLORIDE 600; 310; 30; 20 MG/100ML; MG/100ML; MG/100ML; MG/100ML
INJECTION, SOLUTION INTRAVENOUS CONTINUOUS
Status: DISCONTINUED | OUTPATIENT
Start: 2019-12-17 | End: 2019-12-17 | Stop reason: HOSPADM

## 2019-12-17 RX ORDER — CEFAZOLIN SODIUM 2 G/100ML
2 INJECTION, SOLUTION INTRAVENOUS
Status: COMPLETED | OUTPATIENT
Start: 2019-12-17 | End: 2019-12-17

## 2019-12-17 RX ORDER — DEXAMETHASONE SODIUM PHOSPHATE 4 MG/ML
INJECTION, SOLUTION INTRA-ARTICULAR; INTRALESIONAL; INTRAMUSCULAR; INTRAVENOUS; SOFT TISSUE PRN
Status: DISCONTINUED | OUTPATIENT
Start: 2019-12-17 | End: 2019-12-17

## 2019-12-17 RX ORDER — CEFAZOLIN SODIUM 1 G/50ML
1 INJECTION, SOLUTION INTRAVENOUS SEE ADMIN INSTRUCTIONS
Status: DISCONTINUED | OUTPATIENT
Start: 2019-12-17 | End: 2019-12-17 | Stop reason: HOSPADM

## 2019-12-17 RX ORDER — CELECOXIB 200 MG/1
200 CAPSULE ORAL ONCE
Status: DISCONTINUED | OUTPATIENT
Start: 2019-12-17 | End: 2019-12-17 | Stop reason: HOSPADM

## 2019-12-17 RX ORDER — BUPIVACAINE HYDROCHLORIDE AND EPINEPHRINE 2.5; 5 MG/ML; UG/ML
INJECTION, SOLUTION INFILTRATION; PERINEURAL PRN
Status: DISCONTINUED | OUTPATIENT
Start: 2019-12-17 | End: 2019-12-17 | Stop reason: HOSPADM

## 2019-12-17 RX ORDER — FENTANYL CITRATE 50 UG/ML
25-50 INJECTION, SOLUTION INTRAMUSCULAR; INTRAVENOUS
Status: DISCONTINUED | OUTPATIENT
Start: 2019-12-17 | End: 2019-12-17 | Stop reason: HOSPADM

## 2019-12-17 RX ORDER — NALOXONE HYDROCHLORIDE 0.4 MG/ML
.1-.4 INJECTION, SOLUTION INTRAMUSCULAR; INTRAVENOUS; SUBCUTANEOUS
Status: DISCONTINUED | OUTPATIENT
Start: 2019-12-17 | End: 2019-12-17 | Stop reason: HOSPADM

## 2019-12-17 RX ORDER — MEPERIDINE HYDROCHLORIDE 25 MG/ML
12.5 INJECTION INTRAMUSCULAR; INTRAVENOUS; SUBCUTANEOUS
Status: DISCONTINUED | OUTPATIENT
Start: 2019-12-17 | End: 2019-12-17 | Stop reason: HOSPADM

## 2019-12-17 RX ORDER — LIDOCAINE 40 MG/G
CREAM TOPICAL
Status: DISCONTINUED | OUTPATIENT
Start: 2019-12-17 | End: 2019-12-17 | Stop reason: HOSPADM

## 2019-12-17 RX ORDER — HYDROCODONE BITARTRATE AND ACETAMINOPHEN 5; 325 MG/1; MG/1
1-2 TABLET ORAL EVERY 4 HOURS PRN
Status: DISCONTINUED | OUTPATIENT
Start: 2019-12-17 | End: 2019-12-17 | Stop reason: HOSPADM

## 2019-12-17 RX ORDER — ACETAMINOPHEN 325 MG/1
975 TABLET ORAL ONCE
Status: COMPLETED | OUTPATIENT
Start: 2019-12-17 | End: 2019-12-17

## 2019-12-17 RX ORDER — HYDROCODONE BITARTRATE AND ACETAMINOPHEN 5; 325 MG/1; MG/1
1-2 TABLET ORAL EVERY 6 HOURS PRN
Qty: 20 TABLET | Refills: 0 | Status: SHIPPED | OUTPATIENT
Start: 2019-12-17 | End: 2022-05-26

## 2019-12-17 RX ORDER — ONDANSETRON 2 MG/ML
INJECTION INTRAMUSCULAR; INTRAVENOUS PRN
Status: DISCONTINUED | OUTPATIENT
Start: 2019-12-17 | End: 2019-12-17

## 2019-12-17 RX ORDER — FENTANYL CITRATE 50 UG/ML
INJECTION, SOLUTION INTRAMUSCULAR; INTRAVENOUS PRN
Status: DISCONTINUED | OUTPATIENT
Start: 2019-12-17 | End: 2019-12-17

## 2019-12-17 RX ORDER — ONDANSETRON 2 MG/ML
4 INJECTION INTRAMUSCULAR; INTRAVENOUS EVERY 30 MIN PRN
Status: DISCONTINUED | OUTPATIENT
Start: 2019-12-17 | End: 2019-12-17 | Stop reason: HOSPADM

## 2019-12-17 RX ORDER — GABAPENTIN 300 MG/1
300 CAPSULE ORAL ONCE
Status: COMPLETED | OUTPATIENT
Start: 2019-12-17 | End: 2019-12-17

## 2019-12-17 RX ORDER — PROPOFOL 10 MG/ML
INJECTION, EMULSION INTRAVENOUS PRN
Status: DISCONTINUED | OUTPATIENT
Start: 2019-12-17 | End: 2019-12-17

## 2019-12-17 RX ORDER — ONDANSETRON 4 MG/1
4 TABLET, ORALLY DISINTEGRATING ORAL EVERY 30 MIN PRN
Status: DISCONTINUED | OUTPATIENT
Start: 2019-12-17 | End: 2019-12-17 | Stop reason: HOSPADM

## 2019-12-17 RX ADMIN — MIDAZOLAM 2 MG: 1 INJECTION INTRAMUSCULAR; INTRAVENOUS at 07:41

## 2019-12-17 RX ADMIN — ONDANSETRON 4 MG: 2 INJECTION INTRAMUSCULAR; INTRAVENOUS at 07:44

## 2019-12-17 RX ADMIN — FENTANYL CITRATE 100 MCG: 50 INJECTION, SOLUTION INTRAMUSCULAR; INTRAVENOUS at 07:47

## 2019-12-17 RX ADMIN — LIDOCAINE HYDROCHLORIDE 40 MG: 10 INJECTION, SOLUTION INFILTRATION; PERINEURAL at 07:44

## 2019-12-17 RX ADMIN — DEXAMETHASONE SODIUM PHOSPHATE 4 MG: 4 INJECTION, SOLUTION INTRA-ARTICULAR; INTRALESIONAL; INTRAMUSCULAR; INTRAVENOUS; SOFT TISSUE at 07:44

## 2019-12-17 RX ADMIN — GABAPENTIN 300 MG: 300 CAPSULE ORAL at 07:04

## 2019-12-17 RX ADMIN — LIDOCAINE HYDROCHLORIDE 0.1 ML: 10 INJECTION, SOLUTION EPIDURAL; INFILTRATION; INTRACAUDAL; PERINEURAL at 07:17

## 2019-12-17 RX ADMIN — FENTANYL CITRATE 150 MCG: 50 INJECTION, SOLUTION INTRAMUSCULAR; INTRAVENOUS at 07:44

## 2019-12-17 RX ADMIN — HYDROCODONE BITARTRATE AND ACETAMINOPHEN 1 TABLET: 5; 325 TABLET ORAL at 09:26

## 2019-12-17 RX ADMIN — SODIUM CHLORIDE, POTASSIUM CHLORIDE, SODIUM LACTATE AND CALCIUM CHLORIDE: 600; 310; 30; 20 INJECTION, SOLUTION INTRAVENOUS at 07:17

## 2019-12-17 RX ADMIN — SUGAMMADEX 200 MG: 100 INJECTION, SOLUTION INTRAVENOUS at 08:33

## 2019-12-17 RX ADMIN — PROPOFOL 200 MG: 10 INJECTION, EMULSION INTRAVENOUS at 07:44

## 2019-12-17 RX ADMIN — ACETAMINOPHEN 975 MG: 325 TABLET, FILM COATED ORAL at 07:04

## 2019-12-17 RX ADMIN — ROCURONIUM BROMIDE 50 MG: 10 INJECTION INTRAVENOUS at 07:44

## 2019-12-17 RX ADMIN — CEFAZOLIN SODIUM 2 G: 2 INJECTION, SOLUTION INTRAVENOUS at 07:40

## 2019-12-17 RX ADMIN — HYDROMORPHONE HYDROCHLORIDE 1 MG: 1 INJECTION, SOLUTION INTRAMUSCULAR; INTRAVENOUS; SUBCUTANEOUS at 08:05

## 2019-12-17 ASSESSMENT — MIFFLIN-ST. JEOR: SCORE: 1809.57

## 2019-12-17 NOTE — OP NOTE
Preop diagnosis: Bilateral inguinal hernias    Postop diagnosis: Same    Procedure: Laparoscopic bilateral inguinal hernia repair    Surgeon: Jomar    Assistant surgeon: Bryce Louise PA-C (needed for expertise in camera operation retraction hemostasis wound closure and suctioning)    Anesthesia: General endotracheal Hiltner CRNA    Procedure: Patient's abdomen was clipped of extraneous hair and cleaned and draped in a sterile manner.  1/4% Marcaine with epinephrine was used to anesthetize all port sites.  Small subumbilical curvilinear incision made and subcutaneous tissues dissected to fascia.  Fascia of left rectus muscle open revealing muscle beneath.  This was pulled laterally revealing revealing posterior fascia.  Dissecting balloon trocar inserted into preperitoneal space and insufflated under direct vision.  This was removed and 10 mm trocar placed into preperitoneal space.  Carbon dioxide insufflated to 15 mmHg and patient placed into Trendelenburg position.  Under direct vision, 2 midline 5 mm trochars also placed.  Attention was turned to the midline.  The loose areolar tissue covering the pubic arch was  revealing the bony structure.  This dissection continued laterally to the right pelvic sidewall musculature and then down to the psoas muscle.  The hernia sac, which was indirect, was removed from the inguinal canal and  from the cord structures.  It was swept to the bottom of the operative field.  A piece of [employer] pro- mesh was then used for repair.  It was unrolled from superior to inferior crossing slightly at midline and completely covering Hesselbach's triangle as well as the indirect defect.  The inferior margin the mesh tucked easily under the parietal peritoneum.  Attention was then turned to the left side.  Again the loose tissue was  to the left pelvic sidewall musculature and then down to the psoas muscle.  The hernia sac, which was also indirect, was removed  from the inguinal canal and swept to the bottom of the operative field along with several cord lipomas.  A second piece of pro- mesh was used for repair.  It was unrolled from superior to inferior completely covering the triangle as well as the indirect defect.  This mesh crossed slightly at midline overlying the right-sided mesh.  Again the inferior margin of mesh tucked easily under the parietal peritoneum carbon dioxide was then removed and all trochars removed.  The fascial defect of the subumbilical port was closed using an 0 Vicryl suture in a figure-of-eight fashion and this was bolstered with a second 0 Vicryl on top of that and injected with Marcaine.  All wounds were irrigated with normal saline and the skin closed using 4-0 Vicryl running subcuticular stitches and dressed with Dermabond.    Estimated blood loss: 5 mL    IV fluid: 1000 mL

## 2019-12-17 NOTE — ANESTHESIA CARE TRANSFER NOTE
Patient: Geronimo Arvizu    Procedure(s):  HERNIORRHAPHY, INGUINAL, BILATERAL, LAPAROSCOPIC    Diagnosis: Non-recurrent bilateral inguinal hernia without obstruction or gangrene [K40.20]  Diagnosis Additional Information: No value filed.    Anesthesia Type:   General, ETT     Note:  Airway :Face Mask  Patient transferred to:PACU  Handoff Report: Identifed the Patient, Identified the Reponsible Provider, Reviewed the pertinent medical history, Discussed the surgical course, Reviewed Intra-OP anesthesia mangement and issues during anesthesia, Set expectations for post-procedure period and Allowed opportunity for questions and acknowledgement of understanding      Vitals: (Last set prior to Anesthesia Care Transfer)    CRNA VITALS  12/17/2019 0811 - 12/17/2019 0851      12/17/2019             Pulse:  70    SpO2:  95 %                Electronically Signed By: Jose Maciel CRNA, APRN CRNA  December 17, 2019  8:51 AM

## 2019-12-17 NOTE — DISCHARGE INSTRUCTIONS
DISCHARGE INSTRUCTIONS     1. You may resume your regular diet when you feel you are ready    2. No lifting restrictions.  Okay to lift as pain allows.    3. You will have some discomfort at the incision sites. This is expected. This should improve over the next 2-3 days. Ice and pain medication will help with this pain. Use prescribed pain medication as instructed.     4. Some bruising and mild swelling is normal after surgery. The area below and around the incision(s) may be hard and elevated. This is part of the normal healing process. This will resolve slowly over the next several months.     5. Your wounds are covered with glue. The glue is water tight and so you can shower or bathe immediately following surgery.     6. Use the following medications (in addition to your normal meds) as shown:      Tylenol (acetaminophen) 500 mg every 6 hours as needed for pain.    Do not take more than 1000 mg of Tylenol every 6 hours -OR- 4g in a day    Motrin (ibuprophen) 600 mg every 6 hours as needed for pain. Take with food.     8. Notify Clinic at [phone] if:     Your discomfort is not relieved by your pain medication     You have signs of infection such as temperature above 100.4 degrees orally,  chills, or increasing daily discomfort.     Incision site is becoming more red and/or there is purulent drainage.      9. Follow up with Dr Hadley in 1-2 weeks.    10.  Due to regulations, I am only allowed prescribe to 3 days of postoperative narcotic pain medication.  Should you require a refill, please call or text Dr. Hadley at [phone].    11. Most people take the rest of the week off and return to work the following Monday. You may return sooner as pain allows. During your follow-up appointment, the doctor will give you a formal letter for your work with any restrictions detailed.  All disability or other such paperwork will be addressed at that time.                              Same Day Surgery Discharge  Instructions  Special Precautions After Surgery - Adult    1. It is not unusual to feel lightheaded or faint, up to 24 hours after surgery or while taking pain medication.  If you have these symptoms; sit for a few minutes before standing and have someone assist you when getting up.  2. You should rest and relax for the next 24 hours and must have someone stay with you for at least 24 hours after your discharge.  3. DO NOT DRIVE any vehicle or operate mechanical equipment for 24 hours following the end of your surgery.  DO NOT DRIVE while taking narcotic pain medications that have been prescribed by your physician.  If you had a limb operated on, you must be able to use it fully to drive.  4. DO NOT drink alcoholic beverages for 24 hours following surgery or while taking prescription pain medication.  5. Drink clear liquids (apple juice, ginger ale, broth, 7-Up, etc.).  Progress to your regular diet as you feel able.  6. Any questions call your physician and do not make important decisions for 24 hours.         Call for an appointment to return to the clinic in 2 weeks      Medications:  ? Hydrocodone (Norco, Vicodin): 1-2 tabs as directed for pain.  Next dose due at 1:30.  ? Ibuprofen (Motrin, Advil):  600mg as directed for pain. Next dose due at  ? Stool softener to avoid constipation.  ? Follow the instructions on the bottle.       __________________________________________________________________________________________________________________________________  IMPORTANT NUMBERS:    [facility] [place] Number:  [phone], [phone]  Pharmacy:  [phone]  Same Day Surgery:  [phone], Monday - Friday until 8:30 p.m.  Urgent Care:  [phone]  Emergency Room:  [phone]      [facility]:  [phone]                                                                             [facility]:  [phone] option 1  [facility]:  [phone]      Clinic:  [phone]    [facility]:  [phone]   Home Medical Equipment: [phone]  [facility]:  [phone]

## 2019-12-17 NOTE — ANESTHESIA POSTPROCEDURE EVALUATION
Patient: Geronimo Avrizu    Procedure(s):  HERNIORRHAPHY, INGUINAL, BILATERAL, LAPAROSCOPIC    Diagnosis:Non-recurrent bilateral inguinal hernia without obstruction or gangrene [K40.20]  Diagnosis Additional Information: No value filed.    Anesthesia Type:  General, ETT    Note:  Anesthesia Post Evaluation    Patient location during evaluation: Phase 2 and Bedside  Patient participation: Able to fully participate in evaluation  Level of consciousness: awake and alert  Pain management: adequate  Airway patency: patent  Cardiovascular status: acceptable  Respiratory status: acceptable  Hydration status: acceptable  PONV: none     Anesthetic complications: None          Last vitals:  Vitals:    12/17/19 0915 12/17/19 0930 12/17/19 0932   BP: 125/89 126/83    Pulse:      Resp: 14 14    Temp: 36.4  C (97.6  F)     SpO2: 94% 93% 94%         Electronically Signed By: Jose Maciel CRNA, APRN CRNA  December 17, 2019  9:50 AM

## 2019-12-18 DIAGNOSIS — G89.18 POSTOPERATIVE PAIN: Primary | ICD-10-CM

## 2019-12-18 RX ORDER — TRAMADOL HYDROCHLORIDE 50 MG/1
50-100 TABLET ORAL EVERY 6 HOURS PRN
Qty: 20 TABLET | Refills: 1 | Status: SHIPPED | OUTPATIENT
Start: 2019-12-18 | End: 2022-05-26

## 2019-12-18 RX ORDER — ONDANSETRON 4 MG/1
4-8 TABLET, FILM COATED ORAL EVERY 8 HOURS PRN
Qty: 30 TABLET | Refills: 0 | Status: SHIPPED | OUTPATIENT
Start: 2019-12-18 | End: 2022-05-26

## 2020-01-08 ENCOUNTER — OFFICE VISIT (OUTPATIENT)
Dept: SURGERY | Facility: CLINIC | Age: 55
End: 2020-01-08
Payer: COMMERCIAL

## 2020-01-08 VITALS
DIASTOLIC BLOOD PRESSURE: 80 MMHG | SYSTOLIC BLOOD PRESSURE: 128 MMHG | WEIGHT: 208 LBS | HEIGHT: 71 IN | HEART RATE: 67 BPM | TEMPERATURE: 96.7 F | BODY MASS INDEX: 29.12 KG/M2

## 2020-01-08 DIAGNOSIS — Z09 POSTOP CHECK: ICD-10-CM

## 2020-01-08 DIAGNOSIS — R97.20 ELEVATED PSA: Primary | ICD-10-CM

## 2020-01-08 LAB — PSA SERPL-MCNC: 6.22 UG/L (ref 0–4)

## 2020-01-08 PROCEDURE — 99024 POSTOP FOLLOW-UP VISIT: CPT | Performed by: SURGERY

## 2020-01-08 PROCEDURE — 84153 ASSAY OF PSA TOTAL: CPT | Performed by: SURGERY

## 2020-01-08 PROCEDURE — 36415 COLL VENOUS BLD VENIPUNCTURE: CPT | Performed by: SURGERY

## 2020-01-08 ASSESSMENT — MIFFLIN-ST. JEOR: SCORE: 1809.57

## 2020-01-08 NOTE — PROGRESS NOTES
"No complaints.  Pain minimal.    /80 (BP Location: Right arm, Patient Position: Sitting, Cuff Size: Adult Large)   Pulse 67   Temp 96.7  F (35.9  C) (Tympanic)   Ht 1.81 m (5' 11.25\")   Wt 94.3 kg (208 lb)   BMI 28.81 kg/m      Exam  [id number]  CTAB  RRR  S&NTND+BS, wounds - cdi s erythema  No CCE    A/P s/p lap BIH healing well.    RTC prn.    Kevin Hadley MD   "

## 2020-01-08 NOTE — LETTER
"    1/8/2020         RE: Geronimo Arvizu  [address]  [facility] [address]        Dear Colleague,    Thank you for referring your patient, Geronimo Arvizu, to the [facility]. Please see a copy of my visit note below.    No complaints.  Pain minimal.    /80 (BP Location: Right arm, Patient Position: Sitting, Cuff Size: Adult Large)   Pulse 67   Temp 96.7  F (35.9  C) (Tympanic)   Ht 1.81 m (5' 11.25\")   Wt 94.3 kg (208 lb)   BMI 28.81 kg/m       Exam  [id number]  CTAB  RRR  S&NTND+BS, wounds - cdi s erythema  No CCE    A/P s/p lap BIH healing well.    RTC prn.    Kevin Hadley MD     Again, thank you for allowing me to participate in the care of your patient.        Sincerely,        Kevin Hadley MD    "

## 2020-01-08 NOTE — NURSING NOTE
"Initial /80 (BP Location: Right arm, Patient Position: Sitting, Cuff Size: Adult Large)   Pulse 67   Temp 96.7  F (35.9  C) (Tympanic)   Ht 1.81 m (5' 11.25\")   Wt 94.3 kg (208 lb)   BMI 28.81 kg/m   Estimated body mass index is 28.81 kg/m  as calculated from the following:    Height as of this encounter: 1.81 m (5' 11.25\").    Weight as of this encounter: 94.3 kg (208 lb). .    Leilani Fabian MA    "

## 2020-02-27 ENCOUNTER — OFFICE VISIT (OUTPATIENT)
Dept: FAMILY MEDICINE | Facility: CLINIC | Age: 55
End: 2020-02-27
Payer: COMMERCIAL

## 2020-02-27 VITALS
HEART RATE: 63 BPM | SYSTOLIC BLOOD PRESSURE: 124 MMHG | TEMPERATURE: 97.4 F | RESPIRATION RATE: 12 BRPM | DIASTOLIC BLOOD PRESSURE: 78 MMHG | BODY MASS INDEX: 29.79 KG/M2 | WEIGHT: 212.8 LBS | HEIGHT: 71 IN | OXYGEN SATURATION: 99 %

## 2020-02-27 DIAGNOSIS — M79.675 PAIN OF TOE OF LEFT FOOT: Primary | ICD-10-CM

## 2020-02-27 LAB — URATE SERPL-MCNC: 5.5 MG/DL (ref 3.5–7.2)

## 2020-02-27 PROCEDURE — 84550 ASSAY OF BLOOD/URIC ACID: CPT | Performed by: NURSE PRACTITIONER

## 2020-02-27 PROCEDURE — 36415 COLL VENOUS BLD VENIPUNCTURE: CPT | Performed by: NURSE PRACTITIONER

## 2020-02-27 PROCEDURE — 99213 OFFICE O/P EST LOW 20 MIN: CPT | Performed by: NURSE PRACTITIONER

## 2020-02-27 RX ORDER — NAPROXEN 500 MG/1
500 TABLET ORAL 2 TIMES DAILY WITH MEALS
Qty: 30 TABLET | Refills: 0 | Status: SHIPPED | OUTPATIENT
Start: 2020-02-27 | End: 2022-05-26

## 2020-02-27 ASSESSMENT — MIFFLIN-ST. JEOR: SCORE: 1831.34

## 2020-02-27 NOTE — PATIENT INSTRUCTIONS
Supportive shoes can help alleviate the pain.   Naproxen twice daily as needed for pain.  We will let you know if the lab results suggest you may have gout.  Patient Education     Self-Care for Strains and Sprains  Most minor strains and sprains can be treated with self-care. Recovering from a strain or sprain may take 6 to 8 weeks. Your self-care goal is to reduce pain and immobilize the injury to speed healing.     A sprain injures ligaments (tissue that connects bones to bones).      A strain injures muscles or tendons (tissue that connects muscles to bones).   Support the injured area  Wrapping the injured area provides support for short, necessary activities. Be careful not to wrap the area too tightly. This could cut off the blood supply.    Support a wrist, elbow, or shoulder with a sling.    Wrap an ankle or knee with an elastic bandage.    Tape a finger or toe to the one next to it.  Use cold and heat  Cold reduces swelling. Both cold and heat reduce pain. Heat should not be used in the initial treatment of the injury. When using cold or heat, always place a thin towel between the pack and your skin.    Apply ice or a cold pack 10 to 15 minutes every hour you re awake for the first 2 days.    After the swelling goes down, use cold or heat to control pain. Don t use heat late in the day, since it can cause swelling when you re not active.  Rest and elevate  Rest and elevation help your injury heal faster.    Raise the injured area above your heart level.    Keep the injured area from moving.    Limit the use of the joint or limb.  Use medicine    Aspirin reduces pain and swelling. (Note: Don t give aspirin to a child 18 or younger unless prescribed by the doctor.)    Non-steroidal anti-inflammatory medicines, such as ibuprofen, may reduce pain and swelling, as well. Ask your healthcare provider for advice.  When to call your healthcare provider  Call your healthcare provider if:    The injured joint won t  move, or bones make a grating sound when they move    You can t put weight on the injured area, even after 24 hours    The injured body part is cold, blue, tingling, or numb    The joint or limb appears bent or crooked.    Pain increases or doesn t improve in 4 days    When pressing along the injured area, you notice a spot that is especially painful  Date Last Reviewed: 5/1/2018 2000-2019 [employer]. [address] [place] [address], [place] [id number]. All rights reserved. This information is not intended as a substitute for professional medical care. Always follow your healthcare professional's instructions.

## 2020-02-27 NOTE — PROGRESS NOTES
"Subjective     Geronimo Arvizu is a 54 year old male who presents to clinic today for the following health issues:    HPI   Concern - Right foot/great toe pain   Onset: 1 month ago, went away after 10 days since yesterday symptoms returned     Description:   Right toe swollen, and painful     Intensity: moderate    Progression of Symptoms:  worsening    Accompanying Signs & Symptoms:  Na     Previous history of similar problem:   1 month ago same symptoms he changes diet and after 10 days symptoms Improved     Precipitating factors:   Worsened by: Walking     Alleviating factors:  Improved by: Na     Therapies Tried and outcome: Na     HPI review: Patient states that when he spoke with his friend about his L foot pain and his friend told him that it was likely gout. Patient states that he is going on vacation and would like to make sure he can get \"stronger pain medication\" sincehe will be doing a lot of walking on vacation. Patient denies any injury to his foot or taking anything OTC. Denies, fever, chills, joint swelling or redness.     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, unspecified lymphoma type (H)     Non-recurrent bilateral inguinal hernia without obstruction or gangrene     Past Surgical History:   Procedure Laterality Date     HC TOOTH EXTRACTION W/FORCEP       LAPAROSCOPIC HERNIORRHAPHY INGUINAL BILATERAL Bilateral 12/17/2019    Procedure: HERNIORRHAPHY, INGUINAL, BILATERAL, LAPAROSCOPIC;  Surgeon: Kevin Hadley MD;  Location: [facility] OR     RESECT TUMOR UPPER EXTREMITY Left 11/16/2017    Procedure: RESECT TUMOR UPPER EXTREMITY;  Removal Left Shoulder Tumor;  Surgeon: Callum Denson MD;  Location:  OR       Social History     Tobacco Use     Smoking status: Former Smoker     Smokeless tobacco: Never Used     Tobacco comment: Quit in his early 30's.   Substance Use " "Topics     Alcohol use: Yes     Comment: Occ.      Family History   Problem Relation Age of Onset     Alzheimer Disease Mother      Cirrhosis Father      Other - See Comments Sister         Uterine polyps         Current Outpatient Medications   Medication Sig Dispense Refill     naproxen (NAPROSYN) 500 MG tablet Take 1 tablet (500 mg) by mouth 2 times daily (with meals) 30 tablet 0     HYDROcodone-acetaminophen (NORCO) 5-325 MG tablet Take 1-2 tablets by mouth every 6 hours as needed for pain (Patient not taking: Reported on 1/8/2020) 20 tablet 0     ondansetron (ZOFRAN) 4 MG tablet Take 1-2 tablets (4-8 mg) by mouth every 8 hours as needed for nausea (Patient not taking: Reported on 1/8/2020) 30 tablet 0     traMADol (ULTRAM) 50 MG tablet Take 1-2 tablets ( mg) by mouth every 6 hours as needed for pain (Patient not taking: Reported on 1/8/2020) 20 tablet 1     No Known Allergies    Reviewed and updated as needed this visit by Provider  Tobacco  Allergies  Meds  Problems  Med Hx  Surg Hx  Fam Hx         Review of Systems   ROS COMP: Constitutional, HEENT, cardiovascular, pulmonary, GI, , musculoskeletal, neuro, skin, endocrine and psych systems are negative, except as otherwise noted.      Objective    /78 (BP Location: Right arm, Patient Position: Sitting, Cuff Size: Adult Regular)   Pulse 63   Temp 97.4  F (36.3  C) (Tympanic)   Resp 12   Ht 1.81 m (5' 11.25\")   Wt 96.5 kg (212 lb 12.8 oz)   SpO2 99%   BMI 29.47 kg/m    Body mass index is 29.47 kg/m .  Physical Exam  Constitutional:       Appearance: Normal appearance.   HENT:      Head: Normocephalic and atraumatic.   Eyes:      General: No scleral icterus.  Neck:      Musculoskeletal: Normal range of motion and neck supple.   Cardiovascular:      Rate and Rhythm: Normal rate and regular rhythm.   Pulmonary:      Effort: Pulmonary effort is normal.      Breath sounds: Normal breath sounds.   Musculoskeletal:         General: No " "swelling, tenderness, deformity or signs of injury.      Right lower leg: No edema.      Left lower leg: No edema.      Comments: On the L big toe.   Neurological:      Mental Status: He is alert.       Diagnostic Test Results:  Labs reviewed in Epic  No results found for this or any previous visit (from the past 24 hour(s)).        Assessment & Plan     (M79.675) Pain of toe of left foot  (primary encounter diagnosis)  Comment: No erythema or swelling at the base of the big toe. Mild pain noted on palpation of the media aspect of the foot. Recommend naproxen for pain management. Good foot wear for support. Recommend return to clinic if no improvement.   Will draw uric acid level and notify patient of result if elevated.   Plan: Uric acid, naproxen (NAPROSYN) 500 MG tablet       BMI:   Estimated body mass index is 29.47 kg/m  as calculated from the following:    Height as of this encounter: 1.81 m (5' 11.25\").    Weight as of this encounter: 96.5 kg (212 lb 12.8 oz).   Weight management plan: Discussed healthy diet and exercise guidelines        See Patient Instructions  Patient Instructions     Supportive shoes can help alleviate the pain.   Naproxen twice daily as needed for pain.  We will let you know if the lab results suggest you may have gout.  Patient Education     Self-Care for Strains and Sprains  Most minor strains and sprains can be treated with self-care. Recovering from a strain or sprain may take 6 to 8 weeks. Your self-care goal is to reduce pain and immobilize the injury to speed healing.     A sprain injures ligaments (tissue that connects bones to bones).      A strain injures muscles or tendons (tissue that connects muscles to bones).   Support the injured area  Wrapping the injured area provides support for short, necessary activities. Be careful not to wrap the area too tightly. This could cut off the blood supply.    Support a wrist, elbow, or shoulder with a sling.    Wrap an ankle or knee " with an elastic bandage.    Tape a finger or toe to the one next to it.  Use cold and heat  Cold reduces swelling. Both cold and heat reduce pain. Heat should not be used in the initial treatment of the injury. When using cold or heat, always place a thin towel between the pack and your skin.    Apply ice or a cold pack 10 to 15 minutes every hour you re awake for the first 2 days.    After the swelling goes down, use cold or heat to control pain. Don t use heat late in the day, since it can cause swelling when you re not active.  Rest and elevate  Rest and elevation help your injury heal faster.    Raise the injured area above your heart level.    Keep the injured area from moving.    Limit the use of the joint or limb.  Use medicine    Aspirin reduces pain and swelling. (Note: Don t give aspirin to a child 18 or younger unless prescribed by the doctor.)    Non-steroidal anti-inflammatory medicines, such as ibuprofen, may reduce pain and swelling, as well. Ask your healthcare provider for advice.  When to call your healthcare provider  Call your healthcare provider if:    The injured joint won t move, or bones make a grating sound when they move    You can t put weight on the injured area, even after 24 hours    The injured body part is cold, blue, tingling, or numb    The joint or limb appears bent or crooked.    Pain increases or doesn t improve in 4 days    When pressing along the injured area, you notice a spot that is especially painful  Date Last Reviewed: 5/1/2018 2000-2019 [employer]. [address] [place] [address], [place] [address]. All rights reserved. This information is not intended as a substitute for professional medical care. Always follow your healthcare professional's instructions.               Return in about 1 month (around 3/27/2020) for worsening or continued symptoms.    Aidan Turk, Student Nurse Practitioner.   [facility]

## 2020-02-27 NOTE — NURSING NOTE
"Initial /78 (BP Location: Right arm, Patient Position: Sitting, Cuff Size: Adult Regular)   Pulse 63   Temp 97.4  F (36.3  C) (Tympanic)   Resp 12   Ht 1.81 m (5' 11.25\")   Wt 96.5 kg (212 lb 12.8 oz)   SpO2 99%   BMI 29.47 kg/m   Estimated body mass index is 29.47 kg/m  as calculated from the following:    Height as of this encounter: 1.81 m (5' 11.25\").    Weight as of this encounter: 96.5 kg (212 lb 12.8 oz). .      "

## 2020-02-27 NOTE — PROGRESS NOTES
"Subjective     Geronimo Arvizu is a 54 year old male who presents to clinic today for the following health issues:    HPI   Concern - Right foot/great toe pain   Onset: 1 month ago, went away after 10 days since yesterday symptoms returned     Description:   Right toe swollen, and painful     Intensity: moderate    Progression of Symptoms:  worsening    Accompanying Signs & Symptoms:  Na     Previous history of similar problem:   1 month ago same symptoms he changes diet and after 10 days symptoms Improved     Precipitating factors:   Worsened by: Walking     Alleviating factors:  Improved by: Na    Therapies Tried and outcome: Na     Above HPI reviewed. Additionally, friend suggested this may be gout. He is going on vacation and thinks he should \"stronger medication\" in case his foot hurts while walking. No history of gout. Not taking an medications for symptoms. No known injury. No fevers, joint redness of swelling.      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, unspecified lymphoma type (H)     Non-recurrent bilateral inguinal hernia without obstruction or gangrene     Past Surgical History:   Procedure Laterality Date     HC TOOTH EXTRACTION W/FORCEP       LAPAROSCOPIC HERNIORRHAPHY INGUINAL BILATERAL Bilateral 12/17/2019    Procedure: HERNIORRHAPHY, INGUINAL, BILATERAL, LAPAROSCOPIC;  Surgeon: Kevin Hadley MD;  Location: [facility]     RESECT TUMOR UPPER EXTREMITY Left 11/16/2017    Procedure: RESECT TUMOR UPPER EXTREMITY;  Removal Left Shoulder Tumor;  Surgeon: Callum Denson MD;  Location:  OR       Social History     Tobacco Use     Smoking status: Former Smoker     Smokeless tobacco: Never Used     Tobacco comment: Quit in his early 30's.   Substance Use Topics     Alcohol use: Yes     Comment: Occ.      Family History   Problem Relation Age of Onset     Alzheimer Disease " "Mother      Cirrhosis Father      Other - See Comments Sister         Uterine polyps         Current Outpatient Medications   Medication Sig Dispense Refill     naproxen (NAPROSYN) 500 MG tablet Take 1 tablet (500 mg) by mouth 2 times daily (with meals) 30 tablet 0     HYDROcodone-acetaminophen (NORCO) 5-325 MG tablet Take 1-2 tablets by mouth every 6 hours as needed for pain (Patient not taking: Reported on 1/8/2020) 20 tablet 0     ondansetron (ZOFRAN) 4 MG tablet Take 1-2 tablets (4-8 mg) by mouth every 8 hours as needed for nausea (Patient not taking: Reported on 1/8/2020) 30 tablet 0     traMADol (ULTRAM) 50 MG tablet Take 1-2 tablets ( mg) by mouth every 6 hours as needed for pain (Patient not taking: Reported on 1/8/2020) 20 tablet 1       Reviewed and updated as needed this visit by Provider  Tobacco  Allergies  Meds  Problems  Med Hx  Surg Hx  Fam Hx         Review of Systems   ROS COMP: Constitutional, HEENT, cardiovascular, pulmonary, gi and gu systems are negative, except as otherwise noted.      Objective    /78 (BP Location: Right arm, Patient Position: Sitting, Cuff Size: Adult Regular)   Pulse 63   Temp 97.4  F (36.3  C) (Tympanic)   Resp 12   Ht 1.81 m (5' 11.25\")   Wt 96.5 kg (212 lb 12.8 oz)   SpO2 99%   BMI 29.47 kg/m    Body mass index is 29.47 kg/m .  Physical Exam  Vitals signs and nursing note reviewed.   Constitutional:       Appearance: Normal appearance.   Musculoskeletal:      Comments: Left foot: no skin changes, swelling or erythema. Mild TTP at 1st MTP joint. Normal ROM. Sensation intact.   Neurological:      Mental Status: He is alert.          Diagnostic Test Results:  Labs reviewed in Epic  Uric acid pending        Assessment & Plan     1. Pain of toe of left foot  History and exam is not consistent with gout, nothing to suggest fracture, imaging deferred. Patient repeatedly asked for \"stronger pain meds\" during visit. Discussed NSAIDs would be the " "recommendation even if this is a first gout flare. Trial naproxen, conservative care. RTC if no improvement.  - Uric acid  - naproxen (NAPROSYN) 500 MG tablet; Take 1 tablet (500 mg) by mouth 2 times daily (with meals)  Dispense: 30 tablet; Refill: 0     BMI:   Estimated body mass index is 29.47 kg/m  as calculated from the following:    Height as of this encounter: 1.81 m (5' 11.25\").    Weight as of this encounter: 96.5 kg (212 lb 12.8 oz).         Patient Instructions     Supportive shoes can help alleviate the pain.   Naproxen twice daily as needed for pain.  We will let you know if the lab results suggest you may have gout.  Patient Education     Self-Care for Strains and Sprains  Most minor strains and sprains can be treated with self-care. Recovering from a strain or sprain may take 6 to 8 weeks. Your self-care goal is to reduce pain and immobilize the injury to speed healing.     A sprain injures ligaments (tissue that connects bones to bones).      A strain injures muscles or tendons (tissue that connects muscles to bones).   Support the injured area  Wrapping the injured area provides support for short, necessary activities. Be careful not to wrap the area too tightly. This could cut off the blood supply.    Support a wrist, elbow, or shoulder with a sling.    Wrap an ankle or knee with an elastic bandage.    Tape a finger or toe to the one next to it.  Use cold and heat  Cold reduces swelling. Both cold and heat reduce pain. Heat should not be used in the initial treatment of the injury. When using cold or heat, always place a thin towel between the pack and your skin.    Apply ice or a cold pack 10 to 15 minutes every hour you re awake for the first 2 days.    After the swelling goes down, use cold or heat to control pain. Don t use heat late in the day, since it can cause swelling when you re not active.  Rest and elevate  Rest and elevation help your injury heal faster.    Raise the injured area above " your heart level.    Keep the injured area from moving.    Limit the use of the joint or limb.  Use medicine    Aspirin reduces pain and swelling. (Note: Don t give aspirin to a child 18 or younger unless prescribed by the doctor.)    Non-steroidal anti-inflammatory medicines, such as ibuprofen, may reduce pain and swelling, as well. Ask your healthcare provider for advice.  When to call your healthcare provider  Call your healthcare provider if:    The injured joint won t move, or bones make a grating sound when they move    You can t put weight on the injured area, even after 24 hours    The injured body part is cold, blue, tingling, or numb    The joint or limb appears bent or crooked.    Pain increases or doesn t improve in 4 days    When pressing along the injured area, you notice a spot that is especially painful  Date Last Reviewed: 5/1/2018 2000-2019 [employer]. [address] [place] [address]. All rights reserved. This information is not intended as a substitute for professional medical care. Always follow your healthcare professional's instructions.               Return in about 1 month (around 3/27/2020) for worsening or continued symptoms.    TIFFANY Romero [facility]

## 2020-03-04 ENCOUNTER — TELEPHONE (OUTPATIENT)
Dept: FAMILY MEDICINE | Facility: CLINIC | Age: 55
End: 2020-03-04

## 2020-03-04 NOTE — TELEPHONE ENCOUNTER
Panel Management Review      Patient has the following on his problem list: None      Composite cancer screening  Chart review shows that this patient is due/due soon for the following Colonoscopy  Summary:    Patient is due/failing the following:   COLONOSCOPY    Action needed:   Patient needs referral/order: colonoscopy    Type of outreach:    Sent letter.    Questions for provider review:    None                                                                                                                                    Mariposa Phelps MA

## 2020-05-29 NOTE — DISCHARGE INSTRUCTIONS
[facility]  Home Care Following Anesthesia  For 24 hours after surgery:  1. Get plenty of rest.  A responsible adult must stay with you for at least 24 hours after you leave the surgery center.  2. Do not drive or use heavy equipment.  If you have weakness or tingling, don't drive or use heavy equipment until this feeling goes away.   3. Do not drink alcohol.   4. Avoid strenuous or risky activities.  Ask for help when climbing stairs.  5. You may feel lightheaded.  IF so, sit for a few minutes before standing.  Have someone help you get up.   6. If you have nausea (feel sick to your stomach): Drink only clear liquids such as apple juice, ginger ale, broth or 7-Up.  Rest may also help.  Be sure to drink enough fluids.  Move to a regular diet as you feel able.   7. You may have a slight fever.  Call the doctor if your fever is over 100 F (37.7 C) (taken under the tongue) or lasts longer than 24 hours.  8. You may have a dry mouth, a sore throat, muscle aches or trouble sleeping. These should go away after 24 hours.  9. Do not make important or legal decisions.               Tips for taking pain medications  To get the best pain relief possible, remember these points:    Take pain medications as directed, before pain becomes severe.    Pain medication can upset your stomach: taking it with food may help.    Constipation is a common side effect of pain medication. Drink plenty of  fluids.    Eat foods high in fiber. Take a stool softener if recommended by your doctor or pharmacist.    Do not drink alcohol, drive or operate machinery while taking pain medications.    Ask about other ways to control pain, such as with heat, ice or relaxation.    Tylenol/Acetaminophen Consumption  To help encourage the safe use of acetaminophen, the makers of TYLENOL  have lowered the maximum daily dose for single-ingredient Extra Strength TYLENOL  (acetaminophen) products sold in the [place] from 8  Impression: Foreign body in cornea, right eye, initial encounter: T15.01xA. Plan: Discussed status with patient. Central corneal FB OD, guarded prognosis discussed secondary to potential vision loss for central scar. With consent removal of FB in office with 30g needle and alga brush. No complications observed. 1gtt Ofloxacin before and after removal. start 1gtt Ocuflox QID OD for 1 week. 1gtt Cyclogyl 1% OD in office today for pain management. Discussed potential residual rust removal at next visit. Monitor anterior chamber (low grade inflammation) and cornea in 3-4 days with Dr. Nick Butler. RTC if pain, redness or irritation experienced. Safety glasses discussed. pills per day (4,000 mg) to 6 pills per day (3,000 mg). The dosing interval has also changed from 2 pills every 4-6 hours to 2 pills every 6 hours.    If you feel your pain relief is insufficient, you may take Tylenol/Acetaminophen in addition to your narcotic pain medication.     Be careful not to exceed 3,000 mg of Tylenol/Acetaminophen in a 24 hour period from all sources.    If you are taking extra strength Tylenol/acetaminophen (500 mg), the maximum dose is 6 tablets in 24 hours.    If you are taking regular strength acetaminophen (325 mg), the maximum dose is 9 tablets in 24 hours.    Call a doctor for any of the followin. Signs of infection (fever, growing tenderness at the surgery site, a large amount of drainage or bleeding, severe pain, foul-smelling drainage, redness, swelling).  2. It has been over 8 to 10 hours since surgery and you are still not able to urinate (pass water).  3. Headache for over 24 hours.  4. Numbness, tingling or weakness the day after surgery (if you had spinal anesthesia).  Your doctor is:  Dr. Callum Denson, Orthopaedics: [phone]                    Or dial [phone] and ask for the resident on call for:  Orthopaedics  For emergency care, call the:  [facility]: [phone] (TTY for hearing impaired: [phone])

## 2022-05-26 ENCOUNTER — OFFICE VISIT (OUTPATIENT)
Dept: FAMILY MEDICINE | Facility: CLINIC | Age: 57
End: 2022-05-26
Payer: COMMERCIAL

## 2022-05-26 VITALS
HEIGHT: 71 IN | TEMPERATURE: 99.7 F | HEART RATE: 72 BPM | BODY MASS INDEX: 30.8 KG/M2 | DIASTOLIC BLOOD PRESSURE: 80 MMHG | SYSTOLIC BLOOD PRESSURE: 140 MMHG | OXYGEN SATURATION: 99 % | RESPIRATION RATE: 18 BRPM | WEIGHT: 220 LBS

## 2022-05-26 DIAGNOSIS — M79.674 GREAT TOE PAIN, RIGHT: Primary | ICD-10-CM

## 2022-05-26 DIAGNOSIS — Z12.11 SPECIAL SCREENING FOR MALIGNANT NEOPLASMS, COLON: ICD-10-CM

## 2022-05-26 DIAGNOSIS — L03.031 PARONYCHIA OF TOE, RIGHT: ICD-10-CM

## 2022-05-26 LAB
ANION GAP SERPL CALCULATED.3IONS-SCNC: 4 MMOL/L (ref 3–14)
BUN SERPL-MCNC: 14 MG/DL (ref 7–30)
CALCIUM SERPL-MCNC: 9.3 MG/DL (ref 8.5–10.1)
CHLORIDE BLD-SCNC: 104 MMOL/L (ref 94–109)
CO2 SERPL-SCNC: 29 MMOL/L (ref 20–32)
CREAT SERPL-MCNC: 0.93 MG/DL (ref 0.66–1.25)
GFR SERPL CREATININE-BSD FRML MDRD: >90 ML/MIN/1.73M2
GLUCOSE BLD-MCNC: 86 MG/DL (ref 70–99)
POTASSIUM BLD-SCNC: 4.1 MMOL/L (ref 3.4–5.3)
SODIUM SERPL-SCNC: 137 MMOL/L (ref 133–144)
URATE SERPL-MCNC: 5.2 MG/DL (ref 3.5–7.2)

## 2022-05-26 PROCEDURE — 36415 COLL VENOUS BLD VENIPUNCTURE: CPT | Performed by: NURSE PRACTITIONER

## 2022-05-26 PROCEDURE — 80048 BASIC METABOLIC PNL TOTAL CA: CPT | Performed by: NURSE PRACTITIONER

## 2022-05-26 PROCEDURE — 84550 ASSAY OF BLOOD/URIC ACID: CPT | Performed by: NURSE PRACTITIONER

## 2022-05-26 PROCEDURE — 99213 OFFICE O/P EST LOW 20 MIN: CPT | Performed by: NURSE PRACTITIONER

## 2022-05-26 RX ORDER — CEPHALEXIN 500 MG/1
500 CAPSULE ORAL 3 TIMES DAILY
Qty: 21 CAPSULE | Refills: 0 | Status: SHIPPED | OUTPATIENT
Start: 2022-05-26 | End: 2022-06-02

## 2022-05-26 RX ORDER — PREDNISONE 20 MG/1
40 TABLET ORAL DAILY
Qty: 10 TABLET | Refills: 0 | Status: SHIPPED | OUTPATIENT
Start: 2022-05-26 | End: 2022-05-31

## 2022-05-26 NOTE — PATIENT INSTRUCTIONS
Start prednisone for the next 5 days.  Repeat labs today.  Prednisone Discharge Instructions:  Please take the steroid, Prednisone, for the full course as prescribed.  Take Prednisone with food or milk to minimize stomach upset.      Side effects of Prednisone include difficulty sleeping, increased appetite, weight gain, and changes in mood.  If you are diabetic, please monitor your blood sugar regularly while taking this medicine as Prednisone can cause high blood sugar.

## 2022-05-26 NOTE — PROGRESS NOTES
"  Assessment & Plan     Great toe pain, right  History is c/w gout, although last uric acid normal. Discussed arthrocentesis is the standard for diagnosing gout however has only had 2 flares in 2 years. Will start on prednisone today, recheck labs, and make further recommendations once results available.   - predniSONE (DELTASONE) 20 MG tablet; Take 2 tablets (40 mg) by mouth daily for 5 days  - Basic metabolic panel  (Ca, Cl, CO2, Creat, Gluc, K, Na, BUN); Future  - Uric acid; Future      Paronychia of toe, right  Does have evidence of paronychia of second toe at proximal nail fold. No fluctuance. Will start Keflex, follow up if not improving.  - cephALEXin (KEFLEX) 500 MG capsule; Take 1 capsule (500 mg) by mouth 3 times daily for 7 days    Special screening for malignant neoplasms, colon    - Adult Gastro Ref - Procedure Only; Future    956}     BMI:   Estimated body mass index is 30.47 kg/m  as calculated from the following:    Height as of this encounter: 1.81 m (5' 11.25\").    Weight as of this encounter: 99.8 kg (220 lb).       Patient Instructions   Start prednisone for the next 5 days.  Repeat labs today.  Prednisone Discharge Instructions:  Please take the steroid, Prednisone, for the full course as prescribed.  Take Prednisone with food or milk to minimize stomach upset.      Side effects of Prednisone include difficulty sleeping, increased appetite, weight gain, and changes in mood.  If you are diabetic, please monitor your blood sugar regularly while taking this medicine as Prednisone can cause high blood sugar.        Return in about 1 week (around 6/2/2022) for worsening or continued symptoms.    TIFAFNY Romero [facility]    Monica Melton is a 56 year old who presents for the following health issues;     History of Present Illness       Reason for visit:  Foot (toe) hurts    He eats 4 or more servings of fruits and vegetables daily.He consumes 1 sweetened " "beverage(s) daily.He exercises with enough effort to increase his heart rate 20 to 29 minutes per day.  He exercises with enough effort to increase his heart rate 7 days per week.   He is taking medications regularly.       Gout/ Single Inflamed Joint  Onset/Duration: 4-5 days   Description:   Location: big toe - right  Joint Swelling: YES  Redness: YES  Pain: YES  Intensity: severe 8/10  Progression of Symptoms: worsening and constant  Accompanying Signs & Symptoms:  Fevers: no  History:   Trauma to the area: no  Previous history of gout: YES- was seen in February of 2020  Recent illness: no  Alcohol use: no  Diuretic use: no  Precipitating or alleviating factors: None  Therapies tried and outcome: Ibuprofen helps a little     Above HPI reviewed. Additionally, seen by me in 2020 for similar, uric acid normal at that time. Now having pain to right first MTPJ for the past 5 days. Notes he did also have swelling over joint, but that is improved today. Now has some redness, swelling surrounding second toenail. Denies recent trauma.    Denies ETOH for the past year. Has been on mediterranean diet for the past few months due to recent prostate cancer diagnosis. No red meat, no aged cheese. Has been eating a lot of cauliflower and mushrooms.       Review of Systems   Constitutional, HEENT, cardiovascular, pulmonary, gi and gu systems are negative, except as otherwise noted.      Objective    BP (!) 140/80   Pulse 72   Temp 99.7  F (37.6  C) (Tympanic)   Resp 18   Ht 1.81 m (5' 11.25\")   Wt 99.8 kg (220 lb)   SpO2 99%   BMI 30.47 kg/m    Body mass index is 30.47 kg/m .  Physical Exam  Vitals and nursing note reviewed.   Constitutional:       Appearance: Normal appearance.   HENT:      Head: Normocephalic and atraumatic.      Mouth/Throat:      Mouth: Mucous membranes are moist.   Cardiovascular:      Rate and Rhythm: Normal rate.   Pulmonary:      Effort: Pulmonary effort is normal.   Musculoskeletal:      Cervical " back: Neck supple.      Comments: Right foot - no erythema overlying MTPJ of great toe, although joint is TTP. There is erythema, swelling at the proximal nail fold of the second toe. No fluctuance.   Skin:     General: Skin is warm and dry.   Neurological:      General: No focal deficit present.      Mental Status: He is alert.   Psychiatric:         Mood and Affect: Mood normal.         Behavior: Behavior normal.

## 2022-05-31 ENCOUNTER — TELEPHONE (OUTPATIENT)
Dept: FAMILY MEDICINE | Facility: CLINIC | Age: 57
End: 2022-05-31
Payer: COMMERCIAL

## 2022-05-31 DIAGNOSIS — M79.674 GREAT TOE PAIN, RIGHT: Primary | ICD-10-CM

## 2022-05-31 NOTE — TELEPHONE ENCOUNTER
Reason for Call:  Medication or medication refill:    Do you use a [facility]?  Name of the pharmacy and phone number for the current request:  [facility] [phone]    Name of the medication requested: Prednisone    Other request: Patient was prescribed Prednisone tablets for Gout. Patient says gout came back after he finished it. Patient would like refill of medication because it is helping.      Can we leave a detailed message on this number? YES    Phone number patient can be reached at: Home number on file [phone] (home)    Best Time: Any    Call taken on 5/31/2022 at 11:10 AM by Sandra Casillas

## 2022-06-01 RX ORDER — PREDNISONE 20 MG/1
TABLET ORAL
Qty: 18 TABLET | Refills: 0 | Status: SHIPPED | OUTPATIENT
Start: 2022-06-01 | End: 2022-06-16

## 2022-06-01 NOTE — TELEPHONE ENCOUNTER
Emily Gibson:    Patient called back, he is waiting for a response, he is missing work due to the gout, prednisone was helpful, requesting refill, since he was just seen, says same symptoms as when seen on 5-26-22. Please advise.    ROBBIN Coombs

## 2022-06-01 NOTE — TELEPHONE ENCOUNTER
Emily,    Attempted to call the patient without success.  Asking for refill of prednisone.  Is not on anything preventative for gout.  Please advise. Susan DREW RN

## 2022-06-01 NOTE — TELEPHONE ENCOUNTER
I have prescribed a taper of prednisone over 15 days, so he should take as directed on the bottle. If he has rebound pain following this, I will refer him to sports medicine/ortho for consideration of aspiration of fluid from the joint to confirm diagnosis as his recent labs were normal.  Emily Gibson, CNP

## 2022-09-09 ENCOUNTER — HOSPITAL ENCOUNTER (EMERGENCY)
Facility: CLINIC | Age: 57
Discharge: HOME | End: 2022-09-09
Attending: EMERGENCY MEDICINE | Admitting: EMERGENCY MEDICINE
Payer: COMMERCIAL

## 2022-09-09 ENCOUNTER — APPOINTMENT (OUTPATIENT)
Dept: CT IMAGING | Facility: CLINIC | Age: 57
End: 2022-09-09
Attending: EMERGENCY MEDICINE
Payer: COMMERCIAL

## 2022-09-09 VITALS
DIASTOLIC BLOOD PRESSURE: 78 MMHG | OXYGEN SATURATION: 100 % | BODY MASS INDEX: 26.41 KG/M2 | WEIGHT: 195 LBS | RESPIRATION RATE: 18 BRPM | TEMPERATURE: 97.6 F | HEIGHT: 72 IN | SYSTOLIC BLOOD PRESSURE: 134 MMHG | HEART RATE: 56 BPM

## 2022-09-09 DIAGNOSIS — N20.1 URETEROLITHIASIS: ICD-10-CM

## 2022-09-09 DIAGNOSIS — R10.9 FLANK PAIN: ICD-10-CM

## 2022-09-09 DIAGNOSIS — N20.0 KIDNEY STONE: ICD-10-CM

## 2022-09-09 LAB
ALBUMIN SERPL BCG-MCNC: 4.6 G/DL (ref 3.5–5.2)
ALBUMIN UR-MCNC: ABNORMAL MG/DL
ALP SERPL-CCNC: 77 U/L (ref 40–129)
ALT SERPL W P-5'-P-CCNC: 17 U/L (ref 10–50)
ANION GAP SERPL CALCULATED.3IONS-SCNC: 12 MMOL/L (ref 7–15)
APPEARANCE UR: ABNORMAL
AST SERPL W P-5'-P-CCNC: 22 U/L (ref 10–50)
BACTERIA #/AREA URNS HPF: ABNORMAL /HPF
BASOPHILS # BLD AUTO: 0 10E3/UL (ref 0–0.2)
BASOPHILS NFR BLD AUTO: 0 %
BILIRUB SERPL-MCNC: 0.9 MG/DL
BILIRUB UR QL STRIP: NEGATIVE
BUN SERPL-MCNC: 16.1 MG/DL (ref 6–20)
CALCIUM SERPL-MCNC: 9.7 MG/DL (ref 8.6–10)
CHLORIDE SERPL-SCNC: 101 MMOL/L (ref 98–107)
COLOR UR AUTO: YELLOW
CREAT SERPL-MCNC: 1.01 MG/DL (ref 0.67–1.17)
DEPRECATED HCO3 PLAS-SCNC: 26 MMOL/L (ref 22–29)
EOSINOPHIL # BLD AUTO: 0.4 10E3/UL (ref 0–0.7)
EOSINOPHIL NFR BLD AUTO: 3 %
ERYTHROCYTE [DISTWIDTH] IN BLOOD BY AUTOMATED COUNT: 13.2 % (ref 10–15)
GFR SERPL CREATININE-BSD FRML MDRD: 87 ML/MIN/1.73M2
GLUCOSE SERPL-MCNC: 103 MG/DL (ref 70–99)
GLUCOSE UR STRIP-MCNC: NEGATIVE MG/DL
HCT VFR BLD AUTO: 43 % (ref 40–53)
HGB BLD-MCNC: 14.3 G/DL (ref 13.3–17.7)
HGB UR QL STRIP: ABNORMAL
HYALINE CASTS: 3 /LPF
IMM GRANULOCYTES # BLD: 0 10E3/UL
IMM GRANULOCYTES NFR BLD: 0 %
KETONES UR STRIP-MCNC: 5 MG/DL
LEUKOCYTE ESTERASE UR QL STRIP: NEGATIVE
LIPASE SERPL-CCNC: 192 U/L (ref 13–60)
LYMPHOCYTES # BLD AUTO: 1.8 10E3/UL (ref 0.8–5.3)
LYMPHOCYTES NFR BLD AUTO: 15 %
MCH RBC QN AUTO: 28 PG (ref 26.5–33)
MCHC RBC AUTO-ENTMCNC: 33.3 G/DL (ref 31.5–36.5)
MCV RBC AUTO: 84 FL (ref 78–100)
MONOCYTES # BLD AUTO: 0.8 10E3/UL (ref 0–1.3)
MONOCYTES NFR BLD AUTO: 6 %
MUCOUS THREADS #/AREA URNS LPF: PRESENT /LPF
NEUTROPHILS # BLD AUTO: 9.2 10E3/UL (ref 1.6–8.3)
NEUTROPHILS NFR BLD AUTO: 76 %
NITRATE UR QL: NEGATIVE
NRBC # BLD AUTO: 0 10E3/UL
NRBC BLD AUTO-RTO: 0 /100
PH UR STRIP: 6 [PH] (ref 5–7)
PLATELET # BLD AUTO: 269 10E3/UL (ref 150–450)
POTASSIUM SERPL-SCNC: 3.4 MMOL/L (ref 3.4–5.3)
PROT SERPL-MCNC: 7.8 G/DL (ref 6.4–8.3)
RBC # BLD AUTO: 5.1 10E6/UL (ref 4.4–5.9)
RBC URINE: >182 /HPF
SODIUM SERPL-SCNC: 139 MMOL/L (ref 136–145)
SP GR UR STRIP: 1.02 (ref 1–1.03)
SQUAMOUS EPITHELIAL: <1 /HPF
UROBILINOGEN UR STRIP-MCNC: NORMAL MG/DL
WBC # BLD AUTO: 12.2 10E3/UL (ref 4–11)
WBC URINE: 7 /HPF

## 2022-09-09 PROCEDURE — 99285 EMERGENCY DEPT VISIT HI MDM: CPT | Mod: 25 | Performed by: EMERGENCY MEDICINE

## 2022-09-09 PROCEDURE — 81001 URINALYSIS AUTO W/SCOPE: CPT | Performed by: EMERGENCY MEDICINE

## 2022-09-09 PROCEDURE — 96374 THER/PROPH/DIAG INJ IV PUSH: CPT | Performed by: EMERGENCY MEDICINE

## 2022-09-09 PROCEDURE — 83690 ASSAY OF LIPASE: CPT | Performed by: EMERGENCY MEDICINE

## 2022-09-09 PROCEDURE — 96375 TX/PRO/DX INJ NEW DRUG ADDON: CPT | Performed by: EMERGENCY MEDICINE

## 2022-09-09 PROCEDURE — 74176 CT ABD & PELVIS W/O CONTRAST: CPT

## 2022-09-09 PROCEDURE — 80053 COMPREHEN METABOLIC PANEL: CPT | Performed by: EMERGENCY MEDICINE

## 2022-09-09 PROCEDURE — 96361 HYDRATE IV INFUSION ADD-ON: CPT | Performed by: EMERGENCY MEDICINE

## 2022-09-09 PROCEDURE — 99284 EMERGENCY DEPT VISIT MOD MDM: CPT | Performed by: EMERGENCY MEDICINE

## 2022-09-09 PROCEDURE — 250N000013 HC RX MED GY IP 250 OP 250 PS 637: Performed by: EMERGENCY MEDICINE

## 2022-09-09 PROCEDURE — 36415 COLL VENOUS BLD VENIPUNCTURE: CPT | Performed by: EMERGENCY MEDICINE

## 2022-09-09 PROCEDURE — 250N000011 HC RX IP 250 OP 636: Performed by: EMERGENCY MEDICINE

## 2022-09-09 PROCEDURE — 85025 COMPLETE CBC W/AUTO DIFF WBC: CPT | Performed by: EMERGENCY MEDICINE

## 2022-09-09 PROCEDURE — 258N000003 HC RX IP 258 OP 636: Performed by: EMERGENCY MEDICINE

## 2022-09-09 RX ORDER — SODIUM CHLORIDE 9 MG/ML
INJECTION, SOLUTION INTRAVENOUS CONTINUOUS
Status: DISCONTINUED | OUTPATIENT
Start: 2022-09-09 | End: 2022-09-09 | Stop reason: HOSPADM

## 2022-09-09 RX ORDER — TAMSULOSIN HYDROCHLORIDE 0.4 MG/1
0.4 CAPSULE ORAL DAILY
Qty: 7 CAPSULE | Refills: 0 | Status: SHIPPED | OUTPATIENT
Start: 2022-09-09 | End: 2022-09-11

## 2022-09-09 RX ORDER — ONDANSETRON 4 MG/1
4 TABLET, ORALLY DISINTEGRATING ORAL EVERY 8 HOURS PRN
Qty: 10 TABLET | Refills: 0 | Status: SHIPPED | OUTPATIENT
Start: 2022-09-09 | End: 2022-09-11

## 2022-09-09 RX ORDER — OXYCODONE HYDROCHLORIDE 5 MG/1
5 TABLET ORAL EVERY 6 HOURS PRN
Qty: 10 TABLET | Refills: 0 | Status: SHIPPED | OUTPATIENT
Start: 2022-09-09 | End: 2022-09-11

## 2022-09-09 RX ORDER — ACETAMINOPHEN 500 MG
1000 TABLET ORAL ONCE
Status: COMPLETED | OUTPATIENT
Start: 2022-09-09 | End: 2022-09-09

## 2022-09-09 RX ORDER — ONDANSETRON 2 MG/ML
4 INJECTION INTRAMUSCULAR; INTRAVENOUS EVERY 30 MIN PRN
Status: DISCONTINUED | OUTPATIENT
Start: 2022-09-09 | End: 2022-09-09 | Stop reason: HOSPADM

## 2022-09-09 RX ORDER — KETOROLAC TROMETHAMINE 15 MG/ML
15 INJECTION, SOLUTION INTRAMUSCULAR; INTRAVENOUS ONCE
Status: COMPLETED | OUTPATIENT
Start: 2022-09-09 | End: 2022-09-09

## 2022-09-09 RX ADMIN — KETOROLAC TROMETHAMINE 15 MG: 15 INJECTION, SOLUTION INTRAMUSCULAR; INTRAVENOUS at 02:12

## 2022-09-09 RX ADMIN — ONDANSETRON 4 MG: 2 INJECTION INTRAMUSCULAR; INTRAVENOUS at 02:13

## 2022-09-09 RX ADMIN — ACETAMINOPHEN 1000 MG: 500 TABLET, FILM COATED ORAL at 02:23

## 2022-09-09 RX ADMIN — SODIUM CHLORIDE 1000 ML: 9 INJECTION, SOLUTION INTRAVENOUS at 02:18

## 2022-09-09 ASSESSMENT — ENCOUNTER SYMPTOMS
SHORTNESS OF BREATH: 0
FEVER: 0
ABDOMINAL PAIN: 0

## 2022-09-09 ASSESSMENT — ACTIVITIES OF DAILY LIVING (ADL): ADLS_ACUITY_SCORE: 35

## 2022-09-09 NOTE — DISCHARGE INSTRUCTIONS
Tylenol regularly, 1000 mg 4 times daily.  You can also take 600 mg ibuprofen 4 times daily.    Oxycodone for severe pains.    Zofran as needed for nausea, or vomiting.    Flomax as prescribed.    Follow-up with urology.

## 2022-09-09 NOTE — ED TRIAGE NOTES
Pt presents with left flank pain radiating to testicle. Pt feels nausea and emesis x1. Pt denies hx of kidney stones. Pt was recently diagnosed with prostate cancer.      Triage Assessment     Row Name 09/09/22 0152       Triage Assessment (Adult)    Airway WDL WDL       Respiratory WDL    Respiratory WDL WDL       Skin Circulation/Temperature WDL    Skin Circulation/Temperature WDL WDL       Cardiac WDL    Cardiac WDL WDL       Peripheral/Neurovascular WDL    Peripheral Neurovascular WDL WDL       Cognitive/Neuro/Behavioral WDL    Cognitive/Neuro/Behavioral WDL WDL

## 2022-09-09 NOTE — ED PROVIDER NOTES
History     Chief Complaint   Patient presents with     Flank Pain     HPI  Geronimo Arvizu is a 57 year old male who has past medical history significant for prostate cancer presenting to the emergency department with concerns regarding left-sided flank pain, with radiation towards the left lower quadrant of the abdomen, and left hemiscrotum.  Symptoms began during the evening hours.  Attempted to go to sleep, however could not sleep secondary to the pain.  Has had nausea, without any vomiting.  Patient drove himself to the emergency department.  No right-sided flank pains.  Denies any urinary symptoms.  Does have history of hernia repair surgery in the past, without other abdominal surgeries.  Denies any fever.  No cough, chest pain, shortness of breath.  No bowel changes.    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  would get MRI evaluate ligamenture 0 -  Problem list name updated by automated process. Provider to review          Past Medical History:    No past medical history on file.    Past Surgical History:    Past Surgical History:   Procedure Laterality Date     HC TOOTH EXTRACTION W/FORCEP       LAPAROSCOPIC HERNIORRHAPHY INGUINAL BILATERAL Bilateral 12/17/2019    Procedure: HERNIORRHAPHY, INGUINAL, BILATERAL,  LAPAROSCOPIC;  Surgeon: Kevin Hadley MD;  Location: [facility] OR     RESECT TUMOR UPPER EXTREMITY Left 11/16/2017    Procedure: RESECT TUMOR UPPER EXTREMITY;  Removal Left Shoulder Tumor;  Surgeon: Callum Denson MD;  Location: [facility]       Family History:    Family History   Problem Relation Age of Onset     Alzheimer Disease Mother      Cirrhosis Father      Other - See Comments Sister         Uterine polyps       Social History:  Marital Status:   [2]  Social History     Tobacco Use     Smoking status: Former Smoker     Smokeless tobacco: Never Used     Tobacco comment: Quit in his early 30's.   Substance Use Topics     Alcohol use: Yes     Comment: Occ.      Drug use: No        Medications:    ondansetron (ZOFRAN ODT) 4 MG ODT tab  oxyCODONE (ROXICODONE) 5 MG tablet  tamsulosin (FLOMAX) 0.4 MG capsule          Review of Systems   Constitutional: Negative for fever.   Respiratory: Negative for shortness of breath.    Cardiovascular: Negative for chest pain.   Gastrointestinal: Negative for abdominal pain.   Genitourinary:        See HPI   All other systems reviewed and are negative.      Physical Exam   BP: 134/78  Pulse: 56  Temp: 97.6  F (36.4  C)  Resp: 18  Height: 182.9 cm (6')  Weight: 88.5 kg (195 lb)  SpO2: 100 %      Physical Exam  /78   Pulse 56   Temp 97.6  F (36.4  C) (Tympanic)   Resp 18   Ht 1.829 m (6')   Wt 88.5 kg (195 lb)   SpO2 100%   BMI 26.45 kg/m    General: alert, interactive, ill but not toxic appearing  Head: atraumatic  Nose: no rhinorrhea or epistaxis  Ears: no external auditory canal discharge or bleeding.    Eyes: Sclera nonicteric. Conjunctiva noninjected. PERRL   Mouth: no tonsillar erythema, edema, or exudate  Neck: supple, no palp LAD  Lungs: CTAB  CV: RRR, S1/S2; peripheral pulses palpable and symmetric  Abdomen: soft, tender to palpation of the left lower quadrant/suprapubic region, nd, no guarding or rebound. Positive bowel sounds  Extremities: no cyanosis  or edema  Skin: no rash or diaphoresis  Neuro:  strength 5/5 in UE and LEs bilaterally, sensation intact to light touch in UE and LEs bilaterally;       ED Course                 Procedures              Critical Care time:  none               Results for orders placed or performed during the hospital encounter of 09/09/22 (from the past 24 hour(s))   UA with Microscopic reflex to Culture    Specimen: Urine, NOS   Result Value Ref Range    Color Urine Yellow Colorless, Straw, Light Yellow, Yellow    Appearance Urine Slightly Cloudy (A) Clear    Glucose Urine Negative Negative mg/dL    Bilirubin Urine Negative Negative    Ketones Urine 5  (A) Negative mg/dL    Specific Gravity Urine 1.020 1.003 - 1.035    Blood Urine Large (A) Negative    pH Urine 6.0 5.0 - 7.0    Protein Albumin Urine Trace (A) Negative mg/dL    Urobilinogen Urine Normal Normal, 2.0 mg/dL    Nitrite Urine Negative Negative    Leukocyte Esterase Urine Negative Negative    Bacteria Urine Few (A) None Seen /HPF    Mucus Urine Present (A) None Seen /LPF    RBC Urine >182 (H) <=2 /HPF    WBC Urine 7 (H) <=5 /HPF    Squamous Epithelials Urine <1 <=1 /HPF    Hyaline Casts Urine 3 (H) <=2 /LPF    Narrative    Urine Culture not indicated   CBC with platelets differential    Narrative    The following orders were created for panel order CBC with platelets differential.  Procedure                               Abnormality         Status                     ---------                               -----------         ------                     CBC with platelets and d...[[id number]]  Abnormal            Final result                 Please view results for these tests on the individual orders.   Comprehensive metabolic panel   Result Value Ref Range    Sodium 139 136 - 145 mmol/L    Potassium 3.4 3.4 - 5.3 mmol/L    Creatinine 1.01 0.67 - 1.17 mg/dL    Urea Nitrogen 16.1 6.0 - 20.0 mg/dL    Chloride 101 98 - 107 mmol/L    Carbon Dioxide (CO2) 26 22 - 29 mmol/L     Anion Gap 12 7 - 15 mmol/L    Glucose 103 (H) 70 - 99 mg/dL    Calcium 9.7 8.6 - 10.0 mg/dL    Protein Total 7.8 6.4 - 8.3 g/dL    Albumin 4.6 3.5 - 5.2 g/dL    Bilirubin Total 0.9 <=1.2 mg/dL    Alkaline Phosphatase 77 40 - 129 U/L    AST 22 10 - 50 U/L    ALT 17 10 - 50 U/L    GFR Estimate 87 >60 mL/min/1.73m2   Lipase   Result Value Ref Range    Lipase 192 (H) 13 - 60 U/L   CBC with platelets and differential   Result Value Ref Range    WBC Count 12.2 (H) 4.0 - 11.0 10e3/uL    RBC Count 5.10 4.40 - 5.90 10e6/uL    Hemoglobin 14.3 13.3 - 17.7 g/dL    Hematocrit 43.0 40.0 - 53.0 %    MCV 84 78 - 100 fL    MCH 28.0 26.5 - 33.0 pg    MCHC 33.3 31.5 - 36.5 g/dL    RDW 13.2 10.0 - 15.0 %    Platelet Count 269 150 - 450 10e3/uL    % Neutrophils 76 %    % Lymphocytes 15 %    % Monocytes 6 %    % Eosinophils 3 %    % Basophils 0 %    % Immature Granulocytes 0 %    NRBCs per 100 WBC 0 <1 /100    Absolute Neutrophils 9.2 (H) 1.6 - 8.3 10e3/uL    Absolute Lymphocytes 1.8 0.8 - 5.3 10e3/uL    Absolute Monocytes 0.8 0.0 - 1.3 10e3/uL    Absolute Eosinophils 0.4 0.0 - 0.7 10e3/uL    Absolute Basophils 0.0 0.0 - 0.2 10e3/uL    Absolute Immature Granulocytes 0.0 <=0.4 10e3/uL    Absolute NRBCs 0.0 10e3/uL   Abd/pelvis CT - no contrast - Stone Protocol    Narrative    EXAM: CT ABDOMEN PELVIS W/O CONTRAST  LOCATION: [facility]  DATE/TIME: 9/9/2022 3:08 AM    INDICATION: left flank pain.  n v  COMPARISON: None.  TECHNIQUE: CT scan of the abdomen and pelvis was performed without IV contrast. Multiplanar reformats were obtained. Dose reduction techniques were used.  CONTRAST: None.    FINDINGS:   LOWER CHEST: Unremarkable.    HEPATOBILIARY: No significant mass or bile duct dilatation. No calcified gallstones.     PANCREAS: No significant mass, duct dilatation, or inflammatory change.    SPLEEN: A few scattered calcified punctate granuloma seen within the spleen, the spleen is otherwise  unremarkable.    ADRENAL GLANDS: No significant nodules.    KIDNEYS/BLADDER: There is an obstructive 3.8 x 5.2 x5.3 millimeter calculi seen within the left mid ureter with mild proximal hydroureteronephrosis. The left kidney is otherwise unremarkable.    There is a 5 x 6 x 4 mm nonobstructive calculi seen within the right upper pole. A 12 x 6.5 x 16 mm nonobstructive right lower pole collecting system calculi is also present. There is no evidence of right-sided hydronephrosis. The right kidney is   otherwise unremarkable.     The bladder is unremarkable.    BOWEL: No obstruction or inflammatory change. The appendix is normal.    LYMPH NODES: No lymphadenopathy.    VASCULATURE: No abdominal aortic aneurysm.    PELVIC ORGANS: No pelvic masses.    MUSCULOSKELETAL: Unremarkable.      Impression    IMPRESSION:   1.  Obstructive 3.8 x 5.2 x 5.3 mm left mid ureteral calculi with proximal hydroureteronephrosis.  2.  Nonobstructive right collecting system calculi measuring 6 mm in the upper pole and 16 mm in the lower pole respectively.         Medications   ondansetron (ZOFRAN) injection 4 mg (4 mg Intravenous Given 9/9/22 0213)   0.9% sodium chloride BOLUS (0 mLs Intravenous Stopped 9/9/22 0328)     Followed by   sodium chloride 0.9% infusion (has no administration in time range)   ketorolac (TORADOL) injection 15 mg (15 mg Intravenous Given 9/9/22 0212)   acetaminophen (TYLENOL) tablet 1,000 mg (1,000 mg Oral Given 9/9/22 0223)       Assessments & Plan (with Medical Decision Making)  57 year old male, presenting the emergency department with acute onset of left-sided flank pain with radiation towards the groin.  Denies any fever.  No urinary symptoms.  Appears ill, however not septic appearing.  Vitals otherwise normal.  Concern based on symptoms is for possible kidney stone.  CT scan is ordered showing obstructive 5 x 5 x 4 left mid ureteral stone with hydroureteronephrosis.  There are additional nonobstructing stones in  the right kidney.  Patient's basic metabolic panel is normal, with normal creatinine.  CBC unremarkable, with slight elevation of white blood cell count, nonspecific.  Lipase is slightly elevated, however not any significant tenderness to palpation of the upper abdomen, and do not feel that he has component of pancreatitis.  Urinalysis did show large amounts of blood, and 7 white blood cells.  No concerns regarding infected kidney stone.  CT scan with the findings of kidney stones, and patient will be referred to kidney stone clinic.  Oxycodone, Zofran, and Flomax prescribed.  Patient did receive Toradol and Tylenol during ED course, with pain which was much improved.  Follow-up recommendations discussed, expectant management, and return instructions given if severe worsening of pain, fever, or other concerns develop.     I have reviewed the nursing notes.    I have reviewed the findings, diagnosis, plan and need for follow up with the patient.       New Prescriptions    ONDANSETRON (ZOFRAN ODT) 4 MG ODT TAB    Take 1 tablet (4 mg) by mouth every 8 hours as needed for nausea    OXYCODONE (ROXICODONE) 5 MG TABLET    Take 1 tablet (5 mg) by mouth every 6 hours as needed for severe pain    TAMSULOSIN (FLOMAX) 0.4 MG CAPSULE    Take 1 capsule (0.4 mg) by mouth daily       Final diagnoses:   Ureterolithiasis   Flank pain   Kidney stone       9/9/2022   [facility] EMERGENCY DEPT     Calixto, Jorge L Brown MD  09/09/22 8410

## 2022-09-11 ENCOUNTER — HOSPITAL ENCOUNTER (EMERGENCY)
Facility: CLINIC | Age: 57
Discharge: HOME | End: 2022-09-11
Attending: STUDENT IN AN ORGANIZED HEALTH CARE EDUCATION/TRAINING PROGRAM | Admitting: STUDENT IN AN ORGANIZED HEALTH CARE EDUCATION/TRAINING PROGRAM
Payer: COMMERCIAL

## 2022-09-11 VITALS
TEMPERATURE: 98.2 F | DIASTOLIC BLOOD PRESSURE: 73 MMHG | SYSTOLIC BLOOD PRESSURE: 132 MMHG | RESPIRATION RATE: 18 BRPM | HEIGHT: 72 IN | HEART RATE: 45 BPM | OXYGEN SATURATION: 98 % | WEIGHT: 195 LBS | BODY MASS INDEX: 26.41 KG/M2

## 2022-09-11 DIAGNOSIS — N20.1 LEFT URETERAL CALCULUS: ICD-10-CM

## 2022-09-11 LAB
ALBUMIN UR-MCNC: ABNORMAL MG/DL
APPEARANCE UR: CLEAR
BILIRUB UR QL STRIP: NEGATIVE
COLOR UR AUTO: ABNORMAL
GLUCOSE UR STRIP-MCNC: NEGATIVE MG/DL
HGB UR QL STRIP: ABNORMAL
HOLD SPECIMEN: NORMAL
KETONES UR STRIP-MCNC: 80 MG/DL
LEUKOCYTE ESTERASE UR QL STRIP: NEGATIVE
MUCOUS THREADS #/AREA URNS LPF: PRESENT /LPF
NITRATE UR QL: NEGATIVE
PH UR STRIP: 6.5 [PH] (ref 5–7)
RBC URINE: 6 /HPF
SP GR UR STRIP: 1.01 (ref 1–1.03)
SQUAMOUS EPITHELIAL: <1 /HPF
UROBILINOGEN UR STRIP-MCNC: NORMAL MG/DL
WBC URINE: 0 /HPF

## 2022-09-11 PROCEDURE — 99285 EMERGENCY DEPT VISIT HI MDM: CPT | Performed by: STUDENT IN AN ORGANIZED HEALTH CARE EDUCATION/TRAINING PROGRAM

## 2022-09-11 PROCEDURE — 96374 THER/PROPH/DIAG INJ IV PUSH: CPT | Performed by: STUDENT IN AN ORGANIZED HEALTH CARE EDUCATION/TRAINING PROGRAM

## 2022-09-11 PROCEDURE — 96375 TX/PRO/DX INJ NEW DRUG ADDON: CPT | Performed by: STUDENT IN AN ORGANIZED HEALTH CARE EDUCATION/TRAINING PROGRAM

## 2022-09-11 PROCEDURE — 81001 URINALYSIS AUTO W/SCOPE: CPT | Performed by: STUDENT IN AN ORGANIZED HEALTH CARE EDUCATION/TRAINING PROGRAM

## 2022-09-11 PROCEDURE — 96361 HYDRATE IV INFUSION ADD-ON: CPT | Performed by: STUDENT IN AN ORGANIZED HEALTH CARE EDUCATION/TRAINING PROGRAM

## 2022-09-11 PROCEDURE — 96376 TX/PRO/DX INJ SAME DRUG ADON: CPT | Performed by: STUDENT IN AN ORGANIZED HEALTH CARE EDUCATION/TRAINING PROGRAM

## 2022-09-11 PROCEDURE — 99285 EMERGENCY DEPT VISIT HI MDM: CPT | Mod: 25 | Performed by: STUDENT IN AN ORGANIZED HEALTH CARE EDUCATION/TRAINING PROGRAM

## 2022-09-11 PROCEDURE — 250N000011 HC RX IP 250 OP 636: Performed by: STUDENT IN AN ORGANIZED HEALTH CARE EDUCATION/TRAINING PROGRAM

## 2022-09-11 PROCEDURE — 258N000003 HC RX IP 258 OP 636: Performed by: STUDENT IN AN ORGANIZED HEALTH CARE EDUCATION/TRAINING PROGRAM

## 2022-09-11 RX ORDER — TAMSULOSIN HYDROCHLORIDE 0.4 MG/1
0.4 CAPSULE ORAL DAILY
Qty: 7 CAPSULE | Refills: 0 | Status: SHIPPED | OUTPATIENT
Start: 2022-09-11 | End: 2023-03-28

## 2022-09-11 RX ORDER — HYDROMORPHONE HYDROCHLORIDE 1 MG/ML
0.5 INJECTION, SOLUTION INTRAMUSCULAR; INTRAVENOUS; SUBCUTANEOUS ONCE
Status: COMPLETED | OUTPATIENT
Start: 2022-09-11 | End: 2022-09-11

## 2022-09-11 RX ORDER — KETOROLAC TROMETHAMINE 15 MG/ML
15 INJECTION, SOLUTION INTRAMUSCULAR; INTRAVENOUS ONCE
Status: COMPLETED | OUTPATIENT
Start: 2022-09-11 | End: 2022-09-11

## 2022-09-11 RX ORDER — OXYCODONE HYDROCHLORIDE 5 MG/1
5 TABLET ORAL EVERY 4 HOURS PRN
Qty: 10 TABLET | Refills: 0 | Status: SHIPPED | OUTPATIENT
Start: 2022-09-11 | End: 2023-03-28

## 2022-09-11 RX ORDER — ONDANSETRON 2 MG/ML
4 INJECTION INTRAMUSCULAR; INTRAVENOUS ONCE
Status: COMPLETED | OUTPATIENT
Start: 2022-09-11 | End: 2022-09-11

## 2022-09-11 RX ORDER — OLANZAPINE 10 MG/1
2.5 INJECTION, POWDER, LYOPHILIZED, FOR SOLUTION INTRAMUSCULAR ONCE
Status: COMPLETED | OUTPATIENT
Start: 2022-09-11 | End: 2022-09-11

## 2022-09-11 RX ORDER — HYDROMORPHONE HYDROCHLORIDE 1 MG/ML
0.5 INJECTION, SOLUTION INTRAMUSCULAR; INTRAVENOUS; SUBCUTANEOUS
Status: DISCONTINUED | OUTPATIENT
Start: 2022-09-11 | End: 2022-09-11 | Stop reason: HOSPADM

## 2022-09-11 RX ORDER — ONDANSETRON 4 MG/1
4-8 TABLET, ORALLY DISINTEGRATING ORAL EVERY 8 HOURS PRN
Qty: 10 TABLET | Refills: 0 | Status: SHIPPED | OUTPATIENT
Start: 2022-09-11 | End: 2023-03-28

## 2022-09-11 RX ADMIN — KETOROLAC TROMETHAMINE 15 MG: 15 INJECTION, SOLUTION INTRAMUSCULAR; INTRAVENOUS at 12:20

## 2022-09-11 RX ADMIN — ONDANSETRON 4 MG: 2 INJECTION INTRAMUSCULAR; INTRAVENOUS at 12:16

## 2022-09-11 RX ADMIN — SODIUM CHLORIDE 1000 ML: 9 INJECTION, SOLUTION INTRAVENOUS at 14:49

## 2022-09-11 RX ADMIN — SODIUM CHLORIDE 1000 ML: 9 INJECTION, SOLUTION INTRAVENOUS at 12:17

## 2022-09-11 RX ADMIN — HYDROMORPHONE HYDROCHLORIDE 0.5 MG: 1 INJECTION, SOLUTION INTRAMUSCULAR; INTRAVENOUS; SUBCUTANEOUS at 14:49

## 2022-09-11 RX ADMIN — OLANZAPINE 2.5 MG: 10 INJECTION, POWDER, LYOPHILIZED, FOR SOLUTION INTRAMUSCULAR at 14:52

## 2022-09-11 RX ADMIN — HYDROMORPHONE HYDROCHLORIDE 0.5 MG: 1 INJECTION, SOLUTION INTRAMUSCULAR; INTRAVENOUS; SUBCUTANEOUS at 12:18

## 2022-09-11 ASSESSMENT — ACTIVITIES OF DAILY LIVING (ADL)
ADLS_ACUITY_SCORE: 35

## 2022-09-11 NOTE — ED PROVIDER NOTES
"  History     Chief Complaint   Patient presents with     Flank Pain     HPI  Geronimo Arvizu is a 57 year old male recently seen in the department 9/9/2022 for evaluation of left-sided flank pain and diagnosed with 5 mm left-sided mid ureteral calculus.  Patient was discharged from department after visit with recommendations for ibuprofen, Flomax and oxycodone.  He says that he had been feeling well until he awoke early this morning around 5 AM with \"a twinge\" along the left back.  He took ibuprofen and subsequently vomited.  He attempted to take Zofran and oxycodone at around 6 AM and also vomited.  He says he has not been able to tolerate anything by mouth since awakening this morning.  He continues to have achy left flank region pain, nonradiating.  He denies fever, chills, chest pain, cough, shortness of breath, anterior abdominal pain, diarrhea, or genitourinary symptoms.      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  would get MRI evaluate ligamenture 0 -  Problem list name updated by automated process. Provider to review          Past Medical History:    No past medical history on file.    Past Surgical History:    Past Surgical History:   Procedure Laterality Date     HC TOOTH EXTRACTION W/FORCEP       " LAPAROSCOPIC HERNIORRHAPHY INGUINAL BILATERAL Bilateral 12/17/2019    Procedure: HERNIORRHAPHY, INGUINAL, BILATERAL, LAPAROSCOPIC;  Surgeon: Kevin Hadley MD;  Location: [facility] OR     RESECT TUMOR UPPER EXTREMITY Left 11/16/2017    Procedure: RESECT TUMOR UPPER EXTREMITY;  Removal Left Shoulder Tumor;  Surgeon: Callum Denson MD;  Location: [facility] OR       Family History:    Family History   Problem Relation Age of Onset     Alzheimer Disease Mother      Cirrhosis Father      Other - See Comments Sister         Uterine polyps       Social History:  Marital Status:   [2]  Social History     Tobacco Use     Smoking status: Former Smoker     Smokeless tobacco: Never Used     Tobacco comment: Quit in his early 30's.   Substance Use Topics     Alcohol use: Yes     Comment: Occ.      Drug use: No        Medications:    ondansetron (ZOFRAN ODT) 4 MG ODT tab  oxyCODONE (ROXICODONE) 5 MG tablet  tamsulosin (FLOMAX) 0.4 MG capsule          Review of Systems  Constitutional:  Negative for fever or chills.  Cardiovascular:  Negative for chest discomfort.  Respiratory:  Negative for cough or shortness of breath.   Gastrointestinal: Positive for left flank pain with nausea and vomiting.  Negative for constipation or diarrhea.  Genitourinary:  Negative for dysuria or elder hematuria.  Musculoskeletal:  Denies recent injury.    All others reviewed and are negative.      Physical Exam   BP: (!) 154/87  Pulse: 50  Temp: 98.2  F (36.8  C)  Resp: 20  Height: 182.9 cm (6')  Weight: 88.5 kg (195 lb)  SpO2: 100 %      Physical Exam  Constitutional:  Well developed, well nourished.  Appears uncomfortable but nontoxic.  HENT:  Normocephalic and atraumatic.  Symmetric in appearance.  Eyes:  Conjunctivae are normal.  Cardiovascular:  No cyanosis.  RRR.  Respiratory:  Effort normal without sign of respiratory distress.  No audible wheezing or stridor.   Gastrointestinal:  Soft nondistended abdomen.  Nontender and without guarding.  No  rigidity or rebound tenderness.  Negative Pacheco's sign.  Negative McBurney's point.    Musculoskeletal:  Moves extremities spontaneously.  Neurological:  Patient is alert.  Skin:  Skin is warm and dry.  Psychiatric:  Normal mood and affect.      ED Course                 Procedures             Critical Care time:  none               Results for orders placed or performed during the hospital encounter of 09/11/22 (from the past 24 hour(s))   [facility] Draw    Narrative    The following orders were created for panel order [facility] Draw.  Procedure                               Abnormality         Status                     ---------                               -----------         ------                     Extra Blue Top Tube[[id number]]                              Final result               Extra Red Top Tube[[id number]]                               Final result               Extra Green Top (Lithium...[[id number]]                      Final result               Extra Purple Top Tube[[id number]]                            Final result                 Please view results for these tests on the individual orders.   Extra Blue Top Tube   Result Value Ref Range    Hold Specimen JIC    Extra Red Top Tube   Result Value Ref Range    Hold Specimen JIC    Extra Green Top (Lithium Heparin) Tube   Result Value Ref Range    Hold Specimen JIC    Extra Purple Top Tube   Result Value Ref Range    Hold Specimen JIC    UA with Microscopic reflex to Culture    Specimen: Urine, Clean Catch   Result Value Ref Range    Color Urine Straw Colorless, Straw, Light Yellow, Yellow    Appearance Urine Clear Clear    Glucose Urine Negative Negative mg/dL    Bilirubin Urine Negative Negative    Ketones Urine 80  (A) Negative mg/dL    Specific Gravity Urine 1.010 1.003 - 1.035    Blood Urine Moderate (A) Negative    pH Urine 6.5 5.0 - 7.0    Protein Albumin Urine Trace (A) Negative mg/dL    Urobilinogen Urine Normal Normal, 2.0 mg/dL    Nitrite  Urine Negative Negative    Leukocyte Esterase Urine Negative Negative    Mucus Urine Present (A) None Seen /LPF    RBC Urine 6 (H) <=2 /HPF    WBC Urine 0 <=5 /HPF    Squamous Epithelials Urine <1 <=1 /HPF    Narrative    Urine Culture not indicated       Medications   0.9% sodium chloride BOLUS (0 mLs Intravenous Stopped 9/11/22 1319)   ketorolac (TORADOL) injection 15 mg (15 mg Intravenous Given 9/11/22 1220)   ondansetron (ZOFRAN) injection 4 mg (4 mg Intravenous Given 9/11/22 1216)   0.9% sodium chloride BOLUS (0 mLs Intravenous Stopped 9/11/22 1641)   OLANZapine (zyPREXA) IV injection 2.5 mg (2.5 mg Intravenous Given 9/11/22 1452)   HYDROmorphone (PF) (DILAUDID) injection 0.5 mg (0.5 mg Intravenous Given 9/11/22 1449)       Assessments & Plan (with Medical Decision Making)   Geronimo Arvizu is a 57 year old male who presents to the department for evaluation of recurrence of left-sided flank pain suspicious for ureteral colic since patient was diagnosed with 5 mm left-sided stone 9/9/2022.  He woke this morning with intractable pain and subsequent vomiting, inability to tolerate oral analgesic medications.  Patient symptoms improved throughout monitored stay in the department, urinalysis reassuring without sign of infection.  Patient symptoms gradual improved during stay in the department, recommend OTC analgesic medication and refill for ondansetron sent to local pharmacy.  Patient plans to follow-up as scheduled with urologist Tuesday.        Disclaimer:  This note consists of symbols derived from keyboarding, dictation, and/or voice recognition software.  As a result, there may be errors in the script that have gone undetected.  Please consider this when interpreting information found in the chart.        I have reviewed the nursing notes.    I have reviewed the findings, diagnosis, plan and need for follow up with the patient.       Discharge Medication List as of 9/11/2022  4:41 PM          Final  diagnoses:   Left ureteral calculus       9/11/2022   [facility] EMERGENCY DEPT     Og March DO  09/11/22 3268

## 2022-09-11 NOTE — ED NOTES
Pt resting in room with lights down. Additional IV fluids and pain meds given, see MAR. Call light in reach. Pt given fresh urinal.

## 2022-09-11 NOTE — ED TRIAGE NOTES
Pt was diagnosed with a kidney stone on 9/9 left with some medication and had been feeling better. Woke up with a twinge of pain and took zofran and ibuprofen right away without relief. Pain got much worse 8/10 quickly. Pt started vomiting. Threw up the zofran. Tried 5mg oxycode and started dry heaving.       Triage Assessment     Row Name 09/11/22 5943       Triage Assessment (Adult)    Airway WDL WDL       Respiratory WDL    Respiratory WDL WDL       Skin Circulation/Temperature WDL    Skin Circulation/Temperature WDL WDL       Cardiac WDL    Cardiac WDL WDL       Peripheral/Neurovascular WDL    Peripheral Neurovascular WDL WDL       Cognitive/Neuro/Behavioral WDL    Cognitive/Neuro/Behavioral WDL WDL

## 2022-09-13 ENCOUNTER — HOSPITAL ENCOUNTER (EMERGENCY)
Facility: CLINIC | Age: 57
Discharge: HOME | End: 2022-09-14
Attending: EMERGENCY MEDICINE | Admitting: EMERGENCY MEDICINE
Payer: COMMERCIAL

## 2022-09-13 ENCOUNTER — VIRTUAL VISIT (OUTPATIENT)
Dept: UROLOGY | Facility: CLINIC | Age: 57
End: 2022-09-13
Payer: COMMERCIAL

## 2022-09-13 ENCOUNTER — TELEPHONE (OUTPATIENT)
Dept: NURSING | Facility: CLINIC | Age: 57
End: 2022-09-13

## 2022-09-13 DIAGNOSIS — N20.0 CALCULUS OF KIDNEY: ICD-10-CM

## 2022-09-13 DIAGNOSIS — N20.1 URETEROLITHIASIS: ICD-10-CM

## 2022-09-13 DIAGNOSIS — N20.1 CALCULUS OF URETER: Primary | ICD-10-CM

## 2022-09-13 DIAGNOSIS — N20.0 KIDNEY STONE: ICD-10-CM

## 2022-09-13 DIAGNOSIS — N13.2 HYDRONEPHROSIS WITH URINARY OBSTRUCTION DUE TO URETERAL CALCULUS: ICD-10-CM

## 2022-09-13 DIAGNOSIS — R10.9 ACUTE LEFT FLANK PAIN: ICD-10-CM

## 2022-09-13 DIAGNOSIS — R10.9 FLANK PAIN: ICD-10-CM

## 2022-09-13 LAB
ANION GAP SERPL CALCULATED.3IONS-SCNC: 10 MMOL/L (ref 7–15)
BASOPHILS # BLD AUTO: 0 10E3/UL (ref 0–0.2)
BASOPHILS NFR BLD AUTO: 0 %
BUN SERPL-MCNC: 13 MG/DL (ref 6–20)
CALCIUM SERPL-MCNC: 8.9 MG/DL (ref 8.6–10)
CHLORIDE SERPL-SCNC: 104 MMOL/L (ref 98–107)
CREAT SERPL-MCNC: 1.28 MG/DL (ref 0.67–1.17)
DEPRECATED HCO3 PLAS-SCNC: 26 MMOL/L (ref 22–29)
EOSINOPHIL # BLD AUTO: 0.3 10E3/UL (ref 0–0.7)
EOSINOPHIL NFR BLD AUTO: 3 %
ERYTHROCYTE [DISTWIDTH] IN BLOOD BY AUTOMATED COUNT: 13.1 % (ref 10–15)
GFR SERPL CREATININE-BSD FRML MDRD: 65 ML/MIN/1.73M2
GLUCOSE SERPL-MCNC: 92 MG/DL (ref 70–99)
HCT VFR BLD AUTO: 37.8 % (ref 40–53)
HGB BLD-MCNC: 12.7 G/DL (ref 13.3–17.7)
IMM GRANULOCYTES # BLD: 0 10E3/UL
IMM GRANULOCYTES NFR BLD: 0 %
LYMPHOCYTES # BLD AUTO: 1.7 10E3/UL (ref 0.8–5.3)
LYMPHOCYTES NFR BLD AUTO: 16 %
MCH RBC QN AUTO: 28.3 PG (ref 26.5–33)
MCHC RBC AUTO-ENTMCNC: 33.6 G/DL (ref 31.5–36.5)
MCV RBC AUTO: 84 FL (ref 78–100)
MONOCYTES # BLD AUTO: 1.4 10E3/UL (ref 0–1.3)
MONOCYTES NFR BLD AUTO: 13 %
NEUTROPHILS # BLD AUTO: 7.4 10E3/UL (ref 1.6–8.3)
NEUTROPHILS NFR BLD AUTO: 68 %
NRBC # BLD AUTO: 0 10E3/UL
NRBC BLD AUTO-RTO: 0 /100
PLATELET # BLD AUTO: 231 10E3/UL (ref 150–450)
POTASSIUM SERPL-SCNC: 4.1 MMOL/L (ref 3.4–5.3)
RBC # BLD AUTO: 4.48 10E6/UL (ref 4.4–5.9)
SODIUM SERPL-SCNC: 140 MMOL/L (ref 136–145)
WBC # BLD AUTO: 10.8 10E3/UL (ref 4–11)

## 2022-09-13 PROCEDURE — 80048 BASIC METABOLIC PNL TOTAL CA: CPT | Performed by: EMERGENCY MEDICINE

## 2022-09-13 PROCEDURE — 250N000011 HC RX IP 250 OP 636: Performed by: EMERGENCY MEDICINE

## 2022-09-13 PROCEDURE — 96374 THER/PROPH/DIAG INJ IV PUSH: CPT | Performed by: EMERGENCY MEDICINE

## 2022-09-13 PROCEDURE — 85025 COMPLETE CBC W/AUTO DIFF WBC: CPT | Performed by: EMERGENCY MEDICINE

## 2022-09-13 PROCEDURE — 258N000003 HC RX IP 258 OP 636: Performed by: EMERGENCY MEDICINE

## 2022-09-13 PROCEDURE — 99285 EMERGENCY DEPT VISIT HI MDM: CPT | Performed by: EMERGENCY MEDICINE

## 2022-09-13 PROCEDURE — 99203 OFFICE O/P NEW LOW 30 MIN: CPT | Mod: 95 | Performed by: PHYSICIAN ASSISTANT

## 2022-09-13 PROCEDURE — 99285 EMERGENCY DEPT VISIT HI MDM: CPT | Mod: 25 | Performed by: EMERGENCY MEDICINE

## 2022-09-13 PROCEDURE — 96375 TX/PRO/DX INJ NEW DRUG ADDON: CPT | Performed by: EMERGENCY MEDICINE

## 2022-09-13 PROCEDURE — 36415 COLL VENOUS BLD VENIPUNCTURE: CPT | Performed by: EMERGENCY MEDICINE

## 2022-09-13 RX ORDER — HYDROMORPHONE HCL IN WATER/PF 6 MG/30 ML
.2-.5 PATIENT CONTROLLED ANALGESIA SYRINGE INTRAVENOUS
Status: DISCONTINUED | OUTPATIENT
Start: 2022-09-13 | End: 2022-09-14 | Stop reason: HOSPADM

## 2022-09-13 RX ADMIN — SODIUM CHLORIDE, POTASSIUM CHLORIDE, SODIUM LACTATE AND CALCIUM CHLORIDE 1000 ML: 600; 310; 30; 20 INJECTION, SOLUTION INTRAVENOUS at 23:48

## 2022-09-13 RX ADMIN — HYDROMORPHONE HYDROCHLORIDE 0.5 MG: 0.2 INJECTION, SOLUTION INTRAMUSCULAR; INTRAVENOUS; SUBCUTANEOUS at 22:50

## 2022-09-13 ASSESSMENT — ENCOUNTER SYMPTOMS
LIGHT-HEADEDNESS: 0
FEVER: 0
ABDOMINAL PAIN: 1
HEADACHES: 0
APPETITE CHANGE: 1
NAUSEA: 1
HEMATURIA: 1
FATIGUE: 0
DIARRHEA: 0
VOMITING: 1
CHILLS: 0
CHOKING: 0
SHORTNESS OF BREATH: 0
DYSURIA: 0

## 2022-09-13 ASSESSMENT — ACTIVITIES OF DAILY LIVING (ADL): ADLS_ACUITY_SCORE: 35

## 2022-09-13 ASSESSMENT — PAIN SCALES - GENERAL: PAINLEVEL: MILD PAIN (3)

## 2022-09-13 NOTE — PROGRESS NOTES
Patient is roomed via telephone for a virtual visit.  Patient confirmed he is in the [facility] at the time of this appointment.  Patient understands that this visit is billable and agree to proceed with appointment.

## 2022-09-13 NOTE — H&P (VIEW-ONLY)
Assessment/Plan:    Assessment & Plan   Geronimo was seen today for new patient.    Diagnoses and all orders for this visit:    Calculus of ureter  -     CT Abdomen Pelvis w/o Contrast - LOW DOSE; Future  -     Patient Stated Goal: Pass my stone    Hydronephrosis with urinary obstruction due to ureteral calculus    Acute left flank pain    Calculus of kidney    Stone Management Plan  Stone Management 9/13/2022   Urinary Tract Infection No suspicion of infection   Renal Colic Well controlled symptoms   Renal Failure No suspicion of renal failure   Current CT date 9/9/2022   Right sided stones? Yes   R Number of ureteral stones No ureteral stones   R Number of kidney stones  2   R GSD of kidney stones 15 - 20   R Hydronephrosis None   R Stone Event No current event   R Current Plan Observe   Observe rationale Significant stone burden amenable to emergency management   Left sided stones? Yes   L Number of ureteral stones 1   L GSD of ureteral stones 6   L Location of ureteral stone Proximal   L Number of kidney stones  No renal stones   L Hydronephrosis Mild   L Stone Event New event   Diagnosis date 9/9/2022   Initial location of primary symptomatic stone Proximal   Initial GSD of primary symptomatic stone 6   L MET Status Initiation   L Current Plan MET   MET 2 week F/U         PLAN    58 yo M first time stone former with bounce back ER visit for intractable left flank pain and vomiting due to obstructing left proximal ureteral stone,symptoms improved. Newly diagnosed prostate cancer, further workup pending. Nonobstructing right renal stones.    Will proceed with medical expulsive therapy. Risks and benefits were detailed of medical expulsive therapy including probability of stone passage, recurrent renal colic, and requirement of emergency medical and/or surgical care and further imaging. Patient verbalized understanding. Patient agrees with plan as discussed. He will return in 2 weeks with low dose CT scan.    For  symptom control, he was prescribed oxycodone, ondansetron and Flomax from ER. Over the counter symptom control medications of ibuprofen, Dramamine and Tylenol were recommended.    Telephone call duration: 22 minutes  30 minutes spent on the date of the encounter doing chart review, history and exam, documentation and further activities per the note    Donna Barrera PA-C  [facility]    Subjective:     HPI  Mr. Geronimo Arvizu is a 57 year old  male who is being evaluated via a billable video visit by [facility] [place] following [facility] visits for urolithiasis.    He is a first time unidentified composition stone former. He has no identified modifiable stone risk factors. He has no identified non-modifiable stone risk factors.    He was seen in ER for left flank pain 9/9/22 secondary to obstructing left ureteral stone. He was sent home with flomax and oxycodone. He returned to ER 9/11/22 for recurrent left flank pain with new onset nausea and vomiting. He took zofran and oxycodone but wasn't able to keep them down. He was able to discharge home once symptoms improved.    He went to work today but went home due to recurrent left flank pain. He notes pain radiates into left abdomen and testicle. He took ibuprofen and pain has improved. He reports hot flashes and vomiting when the pain intensifies. Pertinent negative current symptoms include:  fever, chills, right flank pain, hematuria, urinary frequency and dysuria.     CT scan from 9/9/22 is personally reviewed and demonstrates a mildly obstructing 6 mm left proximal ureteral stone. Nonobstructing right renal stones x 2 (7 mm upper pole and 16 mm lower pole).    Significant labs from presentation include moderate hematuria, no pyuria, negative nitrite, mildly elevated WBC, normal creatinine and normal potassium.    ROS   A 12 point comprehensive review of systems is negative except for  HPI    History reviewed. No pertinent past medical history.    Past Surgical History:   Procedure Laterality Date     HC TOOTH EXTRACTION W/FORCEP       LAPAROSCOPIC HERNIORRHAPHY INGUINAL BILATERAL Bilateral 12/17/2019    Procedure: HERNIORRHAPHY, INGUINAL, BILATERAL, LAPAROSCOPIC;  Surgeon: Kevin Hadley MD;  Location: [facility] OR     RESECT TUMOR UPPER EXTREMITY Left 11/16/2017    Procedure: RESECT TUMOR UPPER EXTREMITY;  Removal Left Shoulder Tumor;  Surgeon: Callum Denson MD;  [facility]     Current Outpatient Medications   Medication Sig Dispense Refill     ondansetron (ZOFRAN ODT) 4 MG ODT tab Take 1-2 tablets (4-8 mg) by mouth every 8 hours as needed for nausea 10 tablet 0     oxyCODONE (ROXICODONE) 5 MG tablet Take 1 tablet (5 mg) by mouth every 4 hours as needed for breakthrough pain 10 tablet 0     tamsulosin (FLOMAX) 0.4 MG capsule Take 1 capsule (0.4 mg) by mouth daily 7 capsule 0       No Known Allergies    Social History     Socioeconomic History     Marital status:      Spouse name: Not on file     Number of children: Not on file     Years of education: Not on file     Highest education level: Not on file   Occupational History     Not on file   Tobacco Use     Smoking status: Former Smoker     Smokeless tobacco: Never Used     Tobacco comment: Quit in his early 30's.   Substance and Sexual Activity     Alcohol use: Yes     Comment: Occ.      Drug use: No     Sexual activity: Yes     Partners: Female   Other Topics Concern     Parent/sibling w/ CABG, MI or angioplasty before 65F 55M? Not Asked   Social History Narrative     Not on file     Social Determinants of Health     Financial Resource Strain: Not on file   Food Insecurity: Not on file   Transportation Needs: Not on file   Physical Activity: Not on file   Stress: Not on file   Social Connections: Not on file   Intimate Partner Violence: Not on file   Housing Stability: Not on file       Family History   Problem Relation Age of  Onset     Alzheimer Disease Mother      Cirrhosis Father      Other - See Comments Sister         Uterine polyps       Objective:     No vitals or physical exam obtained due to virtual visit    LABS  Most Recent 3 CBC's:  Recent Labs   Lab Test 09/09/22  0213 11/10/17  1407   WBC 12.2* 8.5   HGB 14.3 15.8   MCV 84 89    301     Most Recent 3 BMP's:  Recent Labs   Lab Test 09/09/22  0213 05/26/22  1604 11/22/19  0804    137 138   POTASSIUM 3.4 4.1 4.1   CHLORIDE 101 104 104   CO2 26 29 31   BUN 16.1 14 16   CR 1.01 0.93 0.91   ANIONGAP 12 4 3   JOCELYN 9.7 9.3 9.1   * 86 103*     Most Recent Urinalysis:  Recent Labs   Lab Test 09/11/22  1424 09/09/22  0201 11/22/19  0810   COLOR Straw   < > Yellow   APPEARANCE Clear   < > Clear   URINEGLC Negative   < > Negative   URINEBILI Negative   < > Negative   URINEKETONE 80 *   < > Negative   SG 1.010   < > 1.020   UBLD Moderate*   < > Trace*   URINEPH 6.5   < > 6.0   PROTEIN Trace*   < > Negative   UROBILINOGEN  --   --  1.0   NITRITE Negative   < > Negative   LEUKEST Negative   < > Negative   RBCU 6*   < > O - 2   WBCU 0   < > 0 - 5    < > = values in this interval not displayed.

## 2022-09-13 NOTE — PATIENT INSTRUCTIONS
Patient Stated Goal: Pass my stone  Symptom Control While Passing A Stone    The goal of [facility] is to let a smaller kidney stone (less than 4 to 5 mm) pass without intervention if possible. Giving your body a chance to clear the stone may take a few hours up to a few weeks.  Keeping you well-informed, safe and fairly comfortable is important.    Drink to thirst  Do not attempt to  flush out  your stone by drinking too much fluid. This does not work and may increase nausea. Drink enough to satisfy your body s thirst. Eating your normal diet is fine.   Medications (that may be suggested or prescribed)    Ibuprofen (Advil or Motrin) Available over the counter  o Take two (200mg) tablets every six hours until the stone passes.  o Prevents spasm of the ureter.    o Decreases pain.      Dramamine* (drowsy version, non-generic formulation) Available over the counter  o Take 50mg at bedtime  o Decreases spasms of the ureter  o Decreases nausea  o Decreases acute pain  o Decreases recurrence of pain for 24 hours  o Will help you sleep  *This medication will cause increase drowsiness, do not drive or operate machinery for 6 hours.      Narcotics (Percocet, Vicodin, Dilaudid) Take as prescribed for severe pain unrelieved by ibuprofen and Dramamine  o Narcotics have significant side effects and only  cover-up  pain. They have no effect on the cause of pain.  o Common side effects  - Confusion, disorientation and sedation - DO NOT DRIVE OR OPERATE MACHINERY WITHIN 24 HOURS  - Nausea - take Dramamine or Zofran or Haldol to help control  - Constipation  - Sleep disturbances      Ondansetron (Zofran) Take as prescribed  o Reserve for severe nausea  o May cause constipation, start over the counter Miralax if needed      Second Line Anti-Nausea Medication: Adding a different anti-nausea medication maybe helpful for persistent nausea.  The combined effect of different types of anti-nausea medications maybe more  effective than either medication by itself, even in higher doses.  o Compazine: Take as prescribed      Information about kidney stones    Crystals can form if chemicals are too concentrated in your urine. If the crystal grows over time, a stone may form. A stone usually isn t painful while it is still in the kidney.    As the stone begins to leave the kidney, you may experience episodes of flank pain as the kidney stone approaches the entrance to the ureter. Some people feel a vague ache in the side.    Kidney stones may fall into the ureter. Some stones are tiny and pass through without causing symptoms. The ureter is a small tube (approximately 1/8 of an inch wide). A kidney stone can get stuck and block the ureter. If this happens, urine backs up and flows back to the kidney. Back pressure on the kidney can cause:  o Severe flank pain radiating to the groin.  o Severe nausea and vomiting.  o The pain can occur in the lower back, side, groin or all three.      When the stone reaches the lower ureter, this can irritate the bladder and sensations of feeling the urge to urinate frequently and urgently may occur.      Once the stone passes out of your ureter and into your bladder, the symptoms of urgency and frequency will often disappear. Sometimes pain will come back for a short period and will not be as severe as before. The passage of the stone from your bladder and out of your body is usually not a problem. The urethra is at least twice as wide as the normal ureter, so the stone doesn t usually block it.    Strain all urine  If you pass the stone, save it. Place it in the container we have provided and bring it to the [facility] within a week of passing it. Your stone will then be sent for analysis which takes about a month.     Signs and symptoms you might experience    Nausea    Decreased appetite    Urinary frequency    Bloody urine     Chills    Fatigue    When to call [facility] or  go to the Emergency Room    Fever with a temperature greater than 100.1    Severe pain    Persistent nausea/vomiting    If the pain worsens or nausea/vomiting is uncontrolled with medications, STOP eating & drinking. You need to have an empty stomach for 8 hours prior to surgery. Call the [facility] immediately at [phone].           Follow-up    Low dose CT scan with doctor visit 1-2 weeks after initial clinic visit per doctor s instructions    Please cancel the CT scan visit if you pass a stone. Reschedule for a one month follow-up with doctor to discuss stone composition and future prevention.    Preventing future stones    Approximately a month after your stone is sent out for analysis, a prevention visit will occur with your provider, to discuss an individualized plan for prevention of new stones and to discuss managing stones that you may still have. Along with the analysis of the kidney stone, blood and urine tests may be indicated to develop this plan. Knowing the type of kidney stones you make, and why, allows the providers at the [facility] to recommend specific ways to prevent them.    Follow-up visits are an important part of monitoring and preventing future re-occurrences.    The [facility] is available for questions or concerns 24 hours a day at [phone]

## 2022-09-13 NOTE — PROGRESS NOTES
Assessment/Plan:    Assessment & Plan   Geronimo was seen today for new patient.    Diagnoses and all orders for this visit:    Calculus of ureter  -     CT Abdomen Pelvis w/o Contrast - LOW DOSE; Future  -     Patient Stated Goal: Pass my stone    Hydronephrosis with urinary obstruction due to ureteral calculus    Acute left flank pain    Calculus of kidney    Stone Management Plan  Stone Management 9/13/2022   Urinary Tract Infection No suspicion of infection   Renal Colic Well controlled symptoms   Renal Failure No suspicion of renal failure   Current CT date 9/9/2022   Right sided stones? Yes   R Number of ureteral stones No ureteral stones   R Number of kidney stones  2   R GSD of kidney stones 15 - 20   R Hydronephrosis None   R Stone Event No current event   R Current Plan Observe   Observe rationale Significant stone burden amenable to emergency management   Left sided stones? Yes   L Number of ureteral stones 1   L GSD of ureteral stones 6   L Location of ureteral stone Proximal   L Number of kidney stones  No renal stones   L Hydronephrosis Mild   L Stone Event New event   Diagnosis date 9/9/2022   Initial location of primary symptomatic stone Proximal   Initial GSD of primary symptomatic stone 6   L MET Status Initiation   L Current Plan MET   MET 2 week F/U         PLAN    58 yo M first time stone former with bounce back ER visit for intractable left flank pain and vomiting due to obstructing left proximal ureteral stone,symptoms improved. Newly diagnosed prostate cancer, further workup pending. Nonobstructing right renal stones.    Will proceed with medical expulsive therapy. Risks and benefits were detailed of medical expulsive therapy including probability of stone passage, recurrent renal colic, and requirement of emergency medical and/or surgical care and further imaging. Patient verbalized understanding. Patient agrees with plan as discussed. He will return in 2 weeks with low dose CT scan.    For  symptom control, he was prescribed oxycodone, ondansetron and Flomax from ER. Over the counter symptom control medications of ibuprofen, Dramamine and Tylenol were recommended.    Telephone call duration: 22 minutes  30 minutes spent on the date of the encounter doing chart review, history and exam, documentation and further activities per the note    Donna Barrera PA-C  [facility]    Subjective:     HPI  Mr. Geronimo Arvizu is a 57 year old  male who is being evaluated via a billable video visit by [facility] [place] following [facility] ER visits for urolithiasis.    He is a first time unidentified composition stone former. He has no identified modifiable stone risk factors. He has no identified non-modifiable stone risk factors.    He was seen in ER for left flank pain 9/9/22 secondary to obstructing left ureteral stone. He was sent home with flomax and oxycodone. He returned to ER 9/11/22 for recurrent left flank pain with new onset nausea and vomiting. He took zofran and oxycodone but wasn't able to keep them down. He was able to discharge home once symptoms improved.    He went to work today but went home due to recurrent left flank pain. He notes pain radiates into left abdomen and testicle. He took ibuprofen and pain has improved. He reports hot flashes and vomiting when the pain intensifies. Pertinent negative current symptoms include:  fever, chills, right flank pain, hematuria, urinary frequency and dysuria.     CT scan from 9/9/22 is personally reviewed and demonstrates a mildly obstructing 6 mm left proximal ureteral stone. Nonobstructing right renal stones x 2 (7 mm upper pole and 16 mm lower pole).    Significant labs from presentation include moderate hematuria, no pyuria, negative nitrite, mildly elevated WBC, normal creatinine and normal potassium.    ROS   A 12 point comprehensive review of systems is negative except for  HPI    History reviewed. No pertinent past medical history.    Past Surgical History:   Procedure Laterality Date     HC TOOTH EXTRACTION W/FORCEP       LAPAROSCOPIC HERNIORRHAPHY INGUINAL BILATERAL Bilateral 12/17/2019    Procedure: HERNIORRHAPHY, INGUINAL, BILATERAL, LAPAROSCOPIC;  Surgeon: Kevin Hadley MD;  Location: [facility] OR     RESECT TUMOR UPPER EXTREMITY Left 11/16/2017    Procedure: RESECT TUMOR UPPER EXTREMITY;  Removal Left Shoulder Tumor;  Surgeon: Callum Denson MD;  [facility]     Current Outpatient Medications   Medication Sig Dispense Refill     ondansetron (ZOFRAN ODT) 4 MG ODT tab Take 1-2 tablets (4-8 mg) by mouth every 8 hours as needed for nausea 10 tablet 0     oxyCODONE (ROXICODONE) 5 MG tablet Take 1 tablet (5 mg) by mouth every 4 hours as needed for breakthrough pain 10 tablet 0     tamsulosin (FLOMAX) 0.4 MG capsule Take 1 capsule (0.4 mg) by mouth daily 7 capsule 0       No Known Allergies    Social History     Socioeconomic History     Marital status:      Spouse name: Not on file     Number of children: Not on file     Years of education: Not on file     Highest education level: Not on file   Occupational History     Not on file   Tobacco Use     Smoking status: Former Smoker     Smokeless tobacco: Never Used     Tobacco comment: Quit in his early 30's.   Substance and Sexual Activity     Alcohol use: Yes     Comment: Occ.      Drug use: No     Sexual activity: Yes     Partners: Female   Other Topics Concern     Parent/sibling w/ CABG, MI or angioplasty before 65F 55M? Not Asked   Social History Narrative     Not on file     Social Determinants of Health     Financial Resource Strain: Not on file   Food Insecurity: Not on file   Transportation Needs: Not on file   Physical Activity: Not on file   Stress: Not on file   Social Connections: Not on file   Intimate Partner Violence: Not on file   Housing Stability: Not on file       Family History   Problem Relation Age of  Onset     Alzheimer Disease Mother      Cirrhosis Father      Other - See Comments Sister         Uterine polyps       Objective:     No vitals or physical exam obtained due to virtual visit    LABS  Most Recent 3 CBC's:  Recent Labs   Lab Test 09/09/22  0213 11/10/17  1407   WBC 12.2* 8.5   HGB 14.3 15.8   MCV 84 89    301     Most Recent 3 BMP's:  Recent Labs   Lab Test 09/09/22  0213 05/26/22  1604 11/22/19  0804    137 138   POTASSIUM 3.4 4.1 4.1   CHLORIDE 101 104 104   CO2 26 29 31   BUN 16.1 14 16   CR 1.01 0.93 0.91   ANIONGAP 12 4 3   JOCELYN 9.7 9.3 9.1   * 86 103*     Most Recent Urinalysis:  Recent Labs   Lab Test 09/11/22  1424 09/09/22  0201 11/22/19  0810   COLOR Straw   < > Yellow   APPEARANCE Clear   < > Clear   URINEGLC Negative   < > Negative   URINEBILI Negative   < > Negative   URINEKETONE 80 *   < > Negative   SG 1.010   < > 1.020   UBLD Moderate*   < > Trace*   URINEPH 6.5   < > 6.0   PROTEIN Trace*   < > Negative   UROBILINOGEN  --   --  1.0   NITRITE Negative   < > Negative   LEUKEST Negative   < > Negative   RBCU 6*   < > O - 2   WBCU 0   < > 0 - 5    < > = values in this interval not displayed.

## 2022-09-14 ENCOUNTER — PREP FOR PROCEDURE (OUTPATIENT)
Dept: UROLOGY | Facility: CLINIC | Age: 57
End: 2022-09-14

## 2022-09-14 ENCOUNTER — TELEPHONE (OUTPATIENT)
Dept: UROLOGY | Facility: CLINIC | Age: 57
End: 2022-09-14

## 2022-09-14 VITALS
HEART RATE: 54 BPM | BODY MASS INDEX: 26.41 KG/M2 | SYSTOLIC BLOOD PRESSURE: 150 MMHG | TEMPERATURE: 98.6 F | WEIGHT: 195 LBS | DIASTOLIC BLOOD PRESSURE: 84 MMHG | HEIGHT: 72 IN | RESPIRATION RATE: 16 BRPM | OXYGEN SATURATION: 96 %

## 2022-09-14 DIAGNOSIS — N20.1 CALCULUS OF URETER: Primary | ICD-10-CM

## 2022-09-14 PROCEDURE — 96376 TX/PRO/DX INJ SAME DRUG ADON: CPT | Performed by: EMERGENCY MEDICINE

## 2022-09-14 PROCEDURE — 250N000011 HC RX IP 250 OP 636: Performed by: EMERGENCY MEDICINE

## 2022-09-14 PROCEDURE — 96361 HYDRATE IV INFUSION ADD-ON: CPT | Performed by: EMERGENCY MEDICINE

## 2022-09-14 RX ORDER — DROPERIDOL 2.5 MG/ML
2.5 INJECTION, SOLUTION INTRAMUSCULAR; INTRAVENOUS ONCE
Status: COMPLETED | OUTPATIENT
Start: 2022-09-14 | End: 2022-09-14

## 2022-09-14 RX ORDER — GABAPENTIN 100 MG/1
300 CAPSULE ORAL
Status: CANCELLED | OUTPATIENT
Start: 2022-09-14

## 2022-09-14 RX ORDER — KETOROLAC TROMETHAMINE 30 MG/ML
15 INJECTION, SOLUTION INTRAMUSCULAR; INTRAVENOUS
Status: CANCELLED | OUTPATIENT
Start: 2022-09-14

## 2022-09-14 RX ORDER — ACETAMINOPHEN 500 MG
1000 TABLET ORAL
Status: CANCELLED | OUTPATIENT
Start: 2022-09-14

## 2022-09-14 RX ORDER — ONDANSETRON 2 MG/ML
4 INJECTION INTRAMUSCULAR; INTRAVENOUS ONCE
Status: COMPLETED | OUTPATIENT
Start: 2022-09-14 | End: 2022-09-14

## 2022-09-14 RX ADMIN — HYDROMORPHONE HYDROCHLORIDE 0.2 MG: 0.2 INJECTION, SOLUTION INTRAMUSCULAR; INTRAVENOUS; SUBCUTANEOUS at 00:10

## 2022-09-14 RX ADMIN — ONDANSETRON 4 MG: 2 INJECTION INTRAMUSCULAR; INTRAVENOUS at 00:03

## 2022-09-14 RX ADMIN — DROPERIDOL 2.5 MG: 2.5 INJECTION, SOLUTION INTRAMUSCULAR; INTRAVENOUS at 03:00

## 2022-09-14 ASSESSMENT — ACTIVITIES OF DAILY LIVING (ADL)
ADLS_ACUITY_SCORE: 35

## 2022-09-14 NOTE — ED PROVIDER NOTES
History     Chief Complaint   Patient presents with     Abdominal Pain     HPI  Geronimo Arvizu is a 57 year old male with a recent history of ureterolithiasis with obstruction presenting for his evaluation for the third time of uncontrolled pain.  He continues to have severe left flank pain with nausea and vomiting.  Symptoms have been waxing waning since his last ER visit with times of relative decent pain control.  Tonight symptoms recurred very quickly with severe sharp pain associate with nausea vomiting.  He attempted to take medications but vomited them up shortly after.  No fever or chills.  Denies dysuria, urgency, or frequency.  Has chronic mild hematuria which is unchanged from baseline due to prostate cancer.  Patient does report decreased urine output over the past few days.  Did urinate once before arrival but only 1 other time a small amount earlier today.    ==================================================================    CHART REVIEW:      CT abd 9/9/22:    IMPRESSION:   1.  Obstructive 3.8 x 5.2 x 5.3 mm left mid ureteral calculi with proximal hydroureteronephrosis.  2.  Nonobstructive right collecting system calculi measuring 6 mm in the upper pole and 16 mm in the lower pole respectively.     Component      Latest Ref Rng & Units 9/9/2022   Sodium      136 - 145 mmol/L 139   Potassium      3.4 - 5.3 mmol/L 3.4   Creatinine      0.67 - 1.17 mg/dL 1.01   Urea Nitrogen      6.0 - 20.0 mg/dL 16.1   Chloride      98 - 107 mmol/L 101   Carbon Dioxide (CO2)      22 - 29 mmol/L 26   Anion Gap      7 - 15 mmol/L 12   Glucose      70 - 99 mg/dL 103 (H)   Calcium      8.6 - 10.0 mg/dL 9.7   Protein Total      6.4 - 8.3 g/dL 7.8   Albumin      3.5 - 5.2 g/dL 4.6   Bilirubin Total      <=1.2 mg/dL 0.9   Alkaline Phosphatase      40 - 129 U/L 77   AST      10 - 50 U/L 22   ALT      10 - 50 U/L 17   GFR Estimate      >60 mL/min/1.73m2 87       END CHART  REVIEW  ==================================================================    Allergies:  No Known Allergies    Problem List:    Patient Active Problem List    Diagnosis Date Noted     Non-recurrent bilateral inguinal hernia without obstruction or gangrene 12/09/2019     Priority: Medium     Added automatically from request for surgery [id number]       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  would get MRI evaluate ligamenture 0 -  Problem list name updated by automated process. Provider to review          Past Medical History:    Past Medical History:   Diagnosis Date     Renal disease        Past Surgical History:    Past Surgical History:   Procedure Laterality Date     HC TOOTH EXTRACTION W/FORCEP       LAPAROSCOPIC HERNIORRHAPHY INGUINAL BILATERAL Bilateral 12/17/2019    Procedure: HERNIORRHAPHY, INGUINAL, BILATERAL, LAPAROSCOPIC;  Surgeon: Kevin Hadley MD;  Location: [facility] OR     RESECT TUMOR UPPER EXTREMITY Left 11/16/2017    Procedure: RESECT TUMOR UPPER EXTREMITY;  Removal Left Shoulder Tumor;  Surgeon: Callum Denson MD;  Location: [facility]       Family History:    Family History   Problem Relation Age of Onset     Alzheimer Disease Mother      Cirrhosis Father      Other - See Comments Sister         Uterine polyps       Social History:  Marital Status:   [2]  Social History     Tobacco Use     Smoking status: Former Smoker     Smokeless tobacco: Never Used     Tobacco comment: Quit in his early 30's.   Substance Use Topics     Alcohol use: Yes     Comment: Occ.      Drug  use: No        Medications:    ondansetron (ZOFRAN ODT) 4 MG ODT tab  oxyCODONE (ROXICODONE) 5 MG tablet  tamsulosin (FLOMAX) 0.4 MG capsule          Review of Systems   Constitutional: Positive for appetite change (Decreased). Negative for chills, fatigue and fever.   HENT: Negative for congestion.    Respiratory: Negative for choking and shortness of breath.    Cardiovascular: Negative for chest pain.   Gastrointestinal: Positive for abdominal pain, nausea and vomiting. Negative for diarrhea.        Last bowel movement was several days ago   Genitourinary: Positive for decreased urine volume and hematuria (Chronic, not significantly changed). Negative for dysuria.   Skin: Negative for rash.   Neurological: Negative for light-headedness and headaches.   All other systems reviewed and are negative.      Physical Exam   BP: (!) 155/89  Pulse: 54  Temp: 98.6  F (37  C)  Resp: 16  Height: 182.9 cm (6')  Weight: 88.5 kg (195 lb)  SpO2: 99 %      Physical Exam  Vitals and nursing note reviewed.   Constitutional:       Appearance: He is diaphoretic.      Comments: Appears uncomfortable and slightly diaphoretic, able to provide a full history   HENT:      Head: Atraumatic.   Eyes:      Conjunctiva/sclera: Conjunctivae normal.   Cardiovascular:      Rate and Rhythm: Normal rate.   Pulmonary:      Effort: Pulmonary effort is normal.   Abdominal:      General: Bowel sounds are normal.      Palpations: Abdomen is soft.      Tenderness: There is no abdominal tenderness.   Musculoskeletal:         General: Normal range of motion.   Skin:     General: Skin is warm.   Neurological:      Mental Status: He is alert and oriented to person, place, and time.   Psychiatric:         Mood and Affect: Mood normal.         ED Course                 Procedures                Results for orders placed or performed during the hospital encounter of 09/13/22   Basic metabolic panel     Status: Abnormal   Result Value Ref Range    Sodium 140 136  - 145 mmol/L    Potassium 4.1 3.4 - 5.3 mmol/L    Chloride 104 98 - 107 mmol/L    Carbon Dioxide (CO2) 26 22 - 29 mmol/L    Anion Gap 10 7 - 15 mmol/L    Urea Nitrogen 13.0 6.0 - 20.0 mg/dL    Creatinine 1.28 (H) 0.67 - 1.17 mg/dL    Calcium 8.9 8.6 - 10.0 mg/dL    Glucose 92 70 - 99 mg/dL    GFR Estimate 65 >60 mL/min/1.73m2   CBC with platelets and differential     Status: Abnormal   Result Value Ref Range    WBC Count 10.8 4.0 - 11.0 10e3/uL    RBC Count 4.48 4.40 - 5.90 10e6/uL    Hemoglobin 12.7 (L) 13.3 - 17.7 g/dL    Hematocrit 37.8 (L) 40.0 - 53.0 %    MCV 84 78 - 100 fL    MCH 28.3 26.5 - 33.0 pg    MCHC 33.6 31.5 - 36.5 g/dL    RDW 13.1 10.0 - 15.0 %    Platelet Count 231 150 - 450 10e3/uL    % Neutrophils 68 %    % Lymphocytes 16 %    % Monocytes 13 %    % Eosinophils 3 %    % Basophils 0 %    % Immature Granulocytes 0 %    NRBCs per 100 WBC 0 <1 /100    Absolute Neutrophils 7.4 1.6 - 8.3 10e3/uL    Absolute Lymphocytes 1.7 0.8 - 5.3 10e3/uL    Absolute Monocytes 1.4 (H) 0.0 - 1.3 10e3/uL    Absolute Eosinophils 0.3 0.0 - 0.7 10e3/uL    Absolute Basophils 0.0 0.0 - 0.2 10e3/uL    Absolute Immature Granulocytes 0.0 <=0.4 10e3/uL    Absolute NRBCs 0.0 10e3/uL   CBC with platelets differential     Status: Abnormal    Narrative    The following orders were created for panel order CBC with platelets differential.  Procedure                               Abnormality         Status                     ---------                               -----------         ------                     CBC with platelets and d...[[id number]]  Abnormal            Final result                 Please view results for these tests on the individual orders.         Medications   lactated ringers BOLUS 1,000 mL (0 mLs Intravenous Stopped 9/14/22 0255)   ondansetron (ZOFRAN) injection 4 mg (4 mg Intravenous Given 9/14/22 0003)   droperidol (INAPSINE) injection 2.5 mg (2.5 mg Intravenous Given 9/14/22 0300)     11:38 PM Patient  re-assessed: Resting comfortably.  Pain improved but still present.  Advised of elevated creatinine and plan for IV fluids.  Patient still not certain he can manage the pain at home.    7:08 AM Patient re-assessed: pain controlled.  Discussed options and patient willing to go home and continue trying management at home.      Assessments & Plan (with Medical Decision Making)  57-year-old male with known obstructive ureterolithiasis presenting for evaluation of uncontrolled pain.  Uncomfortable but nontoxic-appearing.  Screening labs performed showed a bump in his creatinine so he was given IV fluids.  He does feel thirsty.  Having difficulty with nausea so was given Zofran as well as droperidol.  Symptoms of pain did subside while in the ED.  Discussed options for treatment.  Patient agreeable to continue with symptomatic treatment and watchful waiting but will return if his symptoms worsen.     I have reviewed the nursing notes.    I have reviewed the findings, diagnosis, plan and need for follow up with the patient.       Discharge Medication List as of 9/14/2022  7:18 AM          Final diagnoses:   Ureterolithiasis       9/13/2022   [facility] EMERGENCY DEPT     Arthur, Berry Brown MD  09/15/22 0451

## 2022-09-14 NOTE — TELEPHONE ENCOUNTER
[facility] Call Center    Phone Message    May a detailed message be left on voicemail: yes     Reason for Call: Patient is requesting to schedule procedure. Patient originally was going to try and pass stone but ended up in ER for the 3rd time last night due to pain/dehydration. Would like to discuss getting on procedure schedule. Please reach out. Thank you     Action Taken: Message routed to:  [facility] BRIA    Travel Screening: Not Applicable

## 2022-09-14 NOTE — ED NOTES
"Rounded on patient. Patient was sleeping, reports pain 5/10. He declined pain medication and nausea medication at this time. Says that he \"just wants to wait a little bit.\"  Patient has call light in reach and will call when he would like medication for either.  "

## 2022-09-14 NOTE — ED TRIAGE NOTES
Pt presents for continued kidney stone pain and emesis. Pt has been seen Thursday and Sunday for similar sx. Pt had video call with urology. Was told kidney stone left kidney.      Last meds taken: Ibuprofen and Oxycodone 2100 (had emesis right after meds).   Zofran and dramamine 2000.      Triage Assessment     Row Name 09/13/22 6121       Triage Assessment (Adult)    Airway WDL WDL       Respiratory WDL    Respiratory WDL WDL       Skin Circulation/Temperature WDL    Skin Circulation/Temperature WDL WDL       Cardiac WDL    Cardiac WDL WDL       Peripheral/Neurovascular WDL    Peripheral Neurovascular WDL WDL       Cognitive/Neuro/Behavioral WDL    Cognitive/Neuro/Behavioral WDL WDL

## 2022-09-14 NOTE — TELEPHONE ENCOUNTER
Pt calling with concerns about;    States he's been having problems with kidney stones in last week  Was seen in ED on 9/9, 9/11 and had virtual visit with Donna Barrera PA-C today who advised him to take Dramamine.    Pt reports that he's taken the Dramamine, oxycodone and ibuprofen 600 mg (twice in last 4 hours) and is still in extreme pain.    Pt wants to know if he should take another ibuprofen 600 mg.    Advised that ED would be best option with pain of 10/10 even after taking pain medication.    Pt verbalized understanding and agrees with this plan.    Mara Martínez RN, Nurse Advisor 9:20 PM 9/13/2022  COVID 19 Nurse Triage Plan/Patient Instructions    Please be aware that novel coronavirus (COVID-19) may be circulating in the community. If you develop symptoms such as fever, cough, or SOB or if you have concerns about the presence of another infection including coronavirus (COVID-19), please contact your health care provider or visit [email or web address].     Disposition/Instructions    ED Visit recommended. Follow protocol based instructions.     Bring Your Own Device:  Please also bring your smart device(s) (smart phones, tablets, laptops) and their charging cables for your personal use and to communicate with your care team during your visit.    Thank you for taking steps to prevent the spread of this virus.  o Limit your contact with others.  o Wear a simple mask to cover your cough.  o Wash your hands well and often.    Resources    [facility]: About COVID-19: [email or web address]    CDC: What to Do If You're Sick: www.cdc.gov/coronavirus/2019-ncov/about/steps-when-sick.html    CDC: Ending Home Isolation: www.cdc.gov/coronavirus/2019-ncov/hcp/disposition-in-home-patients.html     CDC: Caring for Someone: www.cdc.gov/coronavirus/2019-ncov/if-you-are-sick/care-for-someone.html     [facility]: Interim Guidance for Hospital Discharge to Home:  www.health.[facility].mn.us/diseases/coronavirus/hcp/hospdischarge.pdf    [facility] clinical trials (COVID-19 research studies): [email or web address]     Below are the COVID-19 hotlines at the [facility] ([facility]). Interpreters are available.   o For health questions: Call 988-271-4822 or 1-696.423.6292 (7 a.m. to 7 p.m.)  o For questions about schools and childcare: Call 077-430-9460 or 1-376.550.5232 (7 a.m. to 7 p.m.)

## 2022-09-15 ENCOUNTER — NURSE TRIAGE (OUTPATIENT)
Dept: NURSING | Facility: CLINIC | Age: 57
End: 2022-09-15

## 2022-09-15 ENCOUNTER — ANESTHESIA EVENT (OUTPATIENT)
Dept: SURGERY | Facility: AMBULATORY SURGERY CENTER | Age: 57
End: 2022-09-15
Payer: COMMERCIAL

## 2022-09-15 ENCOUNTER — HOSPITAL ENCOUNTER (OUTPATIENT)
Facility: AMBULATORY SURGERY CENTER | Age: 57
Discharge: HOME | End: 2022-09-15
Attending: UROLOGY
Payer: COMMERCIAL

## 2022-09-15 ENCOUNTER — ANESTHESIA (OUTPATIENT)
Dept: SURGERY | Facility: AMBULATORY SURGERY CENTER | Age: 57
End: 2022-09-15
Payer: COMMERCIAL

## 2022-09-15 VITALS
HEART RATE: 42 BPM | OXYGEN SATURATION: 99 % | TEMPERATURE: 96.6 F | HEIGHT: 72 IN | SYSTOLIC BLOOD PRESSURE: 158 MMHG | DIASTOLIC BLOOD PRESSURE: 91 MMHG | WEIGHT: 195 LBS | BODY MASS INDEX: 26.41 KG/M2 | RESPIRATION RATE: 16 BRPM

## 2022-09-15 DIAGNOSIS — N20.1 CALCULUS OF URETER: ICD-10-CM

## 2022-09-15 PROCEDURE — 52356 CYSTO/URETERO W/LITHOTRIPSY: CPT | Mod: LT | Performed by: UROLOGY

## 2022-09-15 PROCEDURE — 82365 CALCULUS SPECTROSCOPY: CPT | Performed by: UROLOGY

## 2022-09-15 PROCEDURE — 99000 SPECIMEN HANDLING OFFICE-LAB: CPT | Performed by: UROLOGY

## 2022-09-15 DEVICE — URETERAL STENT
Type: IMPLANTABLE DEVICE | Site: URETER | Status: FUNCTIONAL
Brand: PERCUFLEX™ PLUS

## 2022-09-15 RX ORDER — ACETAMINOPHEN 325 MG/1
975 TABLET ORAL ONCE
Status: DISCONTINUED | OUTPATIENT
Start: 2022-09-15 | End: 2022-09-16 | Stop reason: HOSPADM

## 2022-09-15 RX ORDER — GABAPENTIN 300 MG/1
300 CAPSULE ORAL
Status: COMPLETED | OUTPATIENT
Start: 2022-09-15 | End: 2022-09-15

## 2022-09-15 RX ORDER — ONDANSETRON 4 MG/1
4 TABLET, ORALLY DISINTEGRATING ORAL EVERY 30 MIN PRN
Status: DISCONTINUED | OUTPATIENT
Start: 2022-09-15 | End: 2022-09-16 | Stop reason: HOSPADM

## 2022-09-15 RX ORDER — DEXAMETHASONE SODIUM PHOSPHATE 4 MG/ML
INJECTION, SOLUTION INTRA-ARTICULAR; INTRALESIONAL; INTRAMUSCULAR; INTRAVENOUS; SOFT TISSUE PRN
Status: DISCONTINUED | OUTPATIENT
Start: 2022-09-15 | End: 2022-09-15

## 2022-09-15 RX ORDER — CEFAZOLIN SODIUM 2 G/100ML
2 INJECTION, SOLUTION INTRAVENOUS
Status: COMPLETED | OUTPATIENT
Start: 2022-09-15 | End: 2022-09-15

## 2022-09-15 RX ORDER — SODIUM CHLORIDE, SODIUM LACTATE, POTASSIUM CHLORIDE, CALCIUM CHLORIDE 600; 310; 30; 20 MG/100ML; MG/100ML; MG/100ML; MG/100ML
INJECTION, SOLUTION INTRAVENOUS CONTINUOUS
Status: DISCONTINUED | OUTPATIENT
Start: 2022-09-15 | End: 2022-09-16 | Stop reason: HOSPADM

## 2022-09-15 RX ORDER — FENTANYL CITRATE 0.05 MG/ML
25 INJECTION, SOLUTION INTRAMUSCULAR; INTRAVENOUS EVERY 5 MIN PRN
Status: DISCONTINUED | OUTPATIENT
Start: 2022-09-15 | End: 2022-09-16 | Stop reason: HOSPADM

## 2022-09-15 RX ORDER — FENTANYL CITRATE 50 UG/ML
INJECTION, SOLUTION INTRAMUSCULAR; INTRAVENOUS PRN
Status: DISCONTINUED | OUTPATIENT
Start: 2022-09-15 | End: 2022-09-15

## 2022-09-15 RX ORDER — PROPOFOL 10 MG/ML
INJECTION, EMULSION INTRAVENOUS CONTINUOUS PRN
Status: DISCONTINUED | OUTPATIENT
Start: 2022-09-15 | End: 2022-09-15

## 2022-09-15 RX ORDER — KETOROLAC TROMETHAMINE 15 MG/ML
15 INJECTION, SOLUTION INTRAMUSCULAR; INTRAVENOUS
Status: DISCONTINUED | OUTPATIENT
Start: 2022-09-15 | End: 2022-09-16 | Stop reason: HOSPADM

## 2022-09-15 RX ORDER — GLYCOPYRROLATE 0.2 MG/ML
INJECTION, SOLUTION INTRAMUSCULAR; INTRAVENOUS PRN
Status: DISCONTINUED | OUTPATIENT
Start: 2022-09-15 | End: 2022-09-15

## 2022-09-15 RX ORDER — MEPERIDINE HYDROCHLORIDE 25 MG/ML
12.5 INJECTION INTRAMUSCULAR; INTRAVENOUS; SUBCUTANEOUS
Status: DISCONTINUED | OUTPATIENT
Start: 2022-09-15 | End: 2022-09-16 | Stop reason: HOSPADM

## 2022-09-15 RX ORDER — PROPOFOL 10 MG/ML
INJECTION, EMULSION INTRAVENOUS PRN
Status: DISCONTINUED | OUTPATIENT
Start: 2022-09-15 | End: 2022-09-15

## 2022-09-15 RX ORDER — HYDROMORPHONE HCL IN WATER/PF 6 MG/30 ML
0.2 PATIENT CONTROLLED ANALGESIA SYRINGE INTRAVENOUS EVERY 5 MIN PRN
Status: DISCONTINUED | OUTPATIENT
Start: 2022-09-15 | End: 2022-09-16 | Stop reason: HOSPADM

## 2022-09-15 RX ORDER — OXYCODONE HYDROCHLORIDE 5 MG/1
5 TABLET ORAL EVERY 4 HOURS PRN
Status: DISCONTINUED | OUTPATIENT
Start: 2022-09-15 | End: 2022-09-16 | Stop reason: HOSPADM

## 2022-09-15 RX ORDER — ONDANSETRON 2 MG/ML
INJECTION INTRAMUSCULAR; INTRAVENOUS PRN
Status: DISCONTINUED | OUTPATIENT
Start: 2022-09-15 | End: 2022-09-15

## 2022-09-15 RX ORDER — LIDOCAINE 40 MG/G
CREAM TOPICAL
Status: DISCONTINUED | OUTPATIENT
Start: 2022-09-15 | End: 2022-09-16 | Stop reason: HOSPADM

## 2022-09-15 RX ORDER — FENTANYL CITRATE 0.05 MG/ML
25 INJECTION, SOLUTION INTRAMUSCULAR; INTRAVENOUS
Status: DISCONTINUED | OUTPATIENT
Start: 2022-09-15 | End: 2022-09-16 | Stop reason: HOSPADM

## 2022-09-15 RX ORDER — ONDANSETRON 2 MG/ML
4 INJECTION INTRAMUSCULAR; INTRAVENOUS EVERY 30 MIN PRN
Status: DISCONTINUED | OUTPATIENT
Start: 2022-09-15 | End: 2022-09-16 | Stop reason: HOSPADM

## 2022-09-15 RX ORDER — ACETAMINOPHEN 500 MG
1000 TABLET ORAL
Status: COMPLETED | OUTPATIENT
Start: 2022-09-15 | End: 2022-09-15

## 2022-09-15 RX ADMIN — FENTANYL CITRATE 100 MCG: 50 INJECTION, SOLUTION INTRAMUSCULAR; INTRAVENOUS at 07:06

## 2022-09-15 RX ADMIN — PROPOFOL 200 MCG/KG/MIN: 10 INJECTION, EMULSION INTRAVENOUS at 07:06

## 2022-09-15 RX ADMIN — ONDANSETRON 4 MG: 2 INJECTION INTRAMUSCULAR; INTRAVENOUS at 07:06

## 2022-09-15 RX ADMIN — SODIUM CHLORIDE, SODIUM LACTATE, POTASSIUM CHLORIDE, CALCIUM CHLORIDE: 600; 310; 30; 20 INJECTION, SOLUTION INTRAVENOUS at 06:17

## 2022-09-15 RX ADMIN — GABAPENTIN 300 MG: 300 CAPSULE ORAL at 06:10

## 2022-09-15 RX ADMIN — GLYCOPYRROLATE 0.2 MG: 0.2 INJECTION, SOLUTION INTRAMUSCULAR; INTRAVENOUS at 07:06

## 2022-09-15 RX ADMIN — Medication 1000 MG: at 06:10

## 2022-09-15 RX ADMIN — PROPOFOL 200 MG: 10 INJECTION, EMULSION INTRAVENOUS at 07:06

## 2022-09-15 RX ADMIN — KETOROLAC TROMETHAMINE 15 MG: 15 INJECTION, SOLUTION INTRAMUSCULAR; INTRAVENOUS at 06:17

## 2022-09-15 RX ADMIN — CEFAZOLIN SODIUM 2 G: 2 INJECTION, SOLUTION INTRAVENOUS at 07:00

## 2022-09-15 RX ADMIN — DEXAMETHASONE SODIUM PHOSPHATE 10 MG: 4 INJECTION, SOLUTION INTRA-ARTICULAR; INTRALESIONAL; INTRAMUSCULAR; INTRAVENOUS; SOFT TISSUE at 07:06

## 2022-09-15 ASSESSMENT — LIFESTYLE VARIABLES: TOBACCO_USE: 1

## 2022-09-15 NOTE — DISCHARGE INSTRUCTIONS
If you have any questions or concerns regarding your procedure, please contact Dr. Childers, his office number is [phone].    You received 1,000 mg of acetaminophen (Tylenol) at 6:10 AM. Please do not take an additional dose of Tylenol until after 12:10 PM.    Do not exceed 4,000 mg of acetaminophen in a 24 hour period, keep in mind that acetaminophen can also be found in many over-the-counter cold medications as well as narcotics that may be given for pain.    You received a medication called Toradol (a stronger IV ibuprofen) at 6:17 AM. Do NOT take any Ibuprofen / Advil / Motrin / Aleve / Naproxen or products containing Ibuprofen until 12:17 PM or later.    Going Home With a Ureteral Stent    What is it?   A stent is a soft, plastic tube that helps urine (pee) drain into the bladder. During the surgery, it is placed in the ureter the tube that connects the kidney to the bladder. A thin curl at each end of the stent keeps one end in your kidney and the other in your bladder. The stent can not be seen from outside of the body.    Why Do I Have It?   Some sort of blockage is not letting pee drain into your bladder.  This could be from a stone, certain surgeries or kidney infection.    What Should I Expect?   Stents often cause some discomfort. You may have:   -The need to pee suddenly   -Pain when you pee   -A dull backache, which may get worse when you pee  -Blood in your pee (color of fruit punch) and some clots, which may increase with physical activity.     What Can I Do To Feel Better?  -Drink a little more fluids than usual.  You can eat your normal diet.  -Enjoy a warm bath.  -Decrease your activity, some people find bending or twisting movement cause discomfort or increased blood in the urine. Even so, you will not harm yourself.    Stent Removal With String    -Remove the stent in the morning on Wednesday 9/21/22.  -Take Tylenol or Ibuprofen and drink fluids 1 hour prior to removing your stent at  "home.  -Gently pull (slow but purposeful) on the string in the shower while urinating until the stent is completely removed.   -Call the [facility]'s office if you have questions or concerns at [phone].    What To Expect After Your Stent Is Removed    After stent removal, urine may be bloody and possibly have some bloody clots.  You may experience an \"achy\" pain due to urethral spasms.  This generally only lasts a few hours, but should resolve over the next 2-3 days.    When to call your healthcare provider    Call your healthcare provider if:    Fever of 101.5 F (38.6 C) or higher  Bleeding from the that doesn't stop with moderate pressure  Persistent pain or a sudden increase in pain  Nausea with vomiting that does not ease after a few hours.  Abdominal pain or bloating  Fainting  You still have intolerable pain an hour after taking medicine. The medicine may not be strong enough.   You feel too sleepy, dizzy, or groggy. The medicine may be too strong.   You have side effects such as nausea or vomiting, or skin changes such as rash, itching, or hives. Your healthcare provider may suggest other medicines to control side effects.  Rash, itching, or hives may mean you have an allergic reaction. Report this right away. If you have trouble breathing or facial swelling, call 911 right away.    Coping with pain    *Pain after surgery is normal and expected*    If you have pain after surgery, pain medicine will help you feel better. Take it as told, before pain becomes severe. Also, ask your healthcare provider or pharmacist about other ways to control pain. This might be with heat, ice, or relaxation. And follow any other instructions your surgeon or nurse gives you.    Tips for taking pain medicine    To get the best relief possible, remember these points:    Pain medicines can upset your stomach. Taking them with a little food may help.  Most pain relievers taken by mouth need at least 20 to 30 minutes " to start to work.  Don't wait till your pain becomes severe before you take your medicine. Try to time your medicine so that you can take it before starting an activity. This might be before you get dressed, go for a walk, or sit down for dinner.  Constipation is a common side effect of pain medicines. You can take medicines such as laxatives (Miralax) or stool softeners to help ease constipation. Drinking lots of fluids and eating foods such as fruits and vegetables that are high in fiber can also help.  Drinking alcohol and taking pain medicine can cause dizziness and slow your breathing. It can even be deadly. Don't drink alcohol while taking pain medicine.  Pain medicine can make you react more slowly to things. Don't drive or run machinery while taking pain medicine.  Your healthcare provider may tell you to take acetaminophen to help ease your pain. Ask him or her how much you are supposed to take each day. Acetaminophen or other pain relievers may interact with your prescription medicines or other over-the-counter (OTC) medicines. Some prescription medicines have acetaminophen and other ingredients. Using both prescription and OTC acetaminophen for pain can cause you to overdose. Read the labels on your OTC medicines with care. This will help you to clearly know the list of ingredients, how much to take, and any warnings. It may also help you not take too much acetaminophen. If you have questions or don't understand the information, ask your pharmacist or healthcare provider to explain it to you before you take the OTC medicine.    Discharge Instructions: After Your Surgery, regarding Anesthesia    You ve just had surgery. During surgery, you were given medicine called anesthesia to keep you relaxed and free of pain. After surgery, you may have some pain or nausea. This is common. Here are some tips for feeling better and getting well after surgery.    Going home    Your healthcare provider will show you how  to take care of yourself when you go home. He or she will also answer your questions. Have an adult family member or friend drive you home. For the first 24 hours after your surgery:    Don't drive or use heavy equipment.  Don't make important decisions or sign legal papers.  Don't drink alcohol.  Have an adult stay with you for the next 24 hours. He or she can watch for problems and help keep you safe.  Be sure to go to all follow-up visits with your healthcare provider. And rest after your surgery for as long as your healthcare provider tells you to.    Managing nausea    Some people have an upset stomach after surgery. This is often because of anesthesia, pain, or pain medicine, or the stress of surgery. These tips will help you handle nausea and eat healthy foods as you get better. If you were on a special food plan before surgery, ask your healthcare provider if you should follow it while you get better. These tips may help:    Don't push yourself to eat. Your body will tell you when to eat and how much.  Start off with clear liquids and soup. They are easier to digest.  Next try semi-solid foods, such as mashed potatoes, applesauce, and gelatin, as you feel ready.  Slowly move to solid foods. Don t eat fatty, rich, or spicy foods at first.  Don't force yourself to have 3 large meals a day. Instead eat smaller amounts more often.  Take pain medicines with a small amount of solid food, such as crackers or toast, to prevent nausea.    If you have obstructive sleep apnea    You were given anesthesia medicine during surgery to keep you comfortable and free of pain. After surgery, you may have more apnea spells because of this medicine and other medicines you were given. The spells may last longer than usual.   At home:    Keep using the continuous positive airway pressure (CPAP) device when you sleep. Unless your healthcare provider tells you not to, use it when you sleep, day or night. CPAP is a common device used  to treat obstructive sleep apnea.  Talk with your provider before taking any pain medicine, muscle relaxants, or sedatives. Your provider will tell you about the possible dangers of taking these medicines.

## 2022-09-15 NOTE — ANESTHESIA PROCEDURE NOTES
Airway       Patient location during procedure: OR  Staff -        Performed By: CRNA  Consent for Airway        Urgency: elective  Indications and Patient Condition       Indications for airway management: yumi-procedural       Induction type:intravenous       Mask difficulty assessment: 0 - not attempted    Final Airway Details       Final airway type: supraglottic airway    Supraglottic Airway Details        Type: LMA       LMA size: 4    Post intubation assessment        Placement verified by: capnometry, equal breath sounds and chest rise        Number of attempts at approach: 1       Secured with: silk tape       Ease of procedure: easy       Dentition: Intact

## 2022-09-15 NOTE — ANESTHESIA CARE TRANSFER NOTE
Patient: Geronimo Arvizu    Procedure: Procedure(s):  CYSTOURETEROSCOPY, RETROGRADE PYELOGRAM, LASER LITHOTRIPSY WITH CALCULUS REMOVAL AND URETERAL STENT INSERTION       Diagnosis: Calculus of ureter [N20.1]  Diagnosis Additional Information: No value filed.    Anesthesia Type:   General     Note:    Oropharynx: oropharynx clear of all foreign objects and spontaneously breathing  Level of Consciousness: drowsy  Oxygen Supplementation: face mask  Level of Supplemental Oxygen (L/min / FiO2): 8  Independent Airway: airway patency satisfactory and stable  Dentition: dentition unchanged  Vital Signs Stable: post-procedure vital signs reviewed and stable  Report to RN Given: handoff report given  Patient transferred to: PACU    Handoff Report: Identifed the Patient, Identified the Reponsible Provider, Reviewed the pertinent medical history, Discussed the surgical course, Reviewed Intra-OP anesthesia mangement and issues during anesthesia, Set expectations for post-procedure period and Allowed opportunity for questions and acknowledgement of understanding      Vitals:  Vitals Value Taken Time   /62 09/15/22 0751   Temp 36.2  C (97.2  F) 09/15/22 0751   Pulse 57 09/15/22 0751   Resp 16 09/15/22 0751   SpO2 98 % 09/15/22 0751       Electronically Signed By: TIFFANY Lynn CRNA  September 15, 2022  7:53 AM

## 2022-09-15 NOTE — OP NOTE
[facility] Operative Note    Geronimo Arvizu   September 15, 2022   9/15/2022   Center - [facility]     Procedure Performed  Procedure(s):  CYSTOURETEROSCOPY, RETROGRADE PYELOGRAM, LASER LITHOTRIPSY WITH CALCULUS REMOVAL AND URETERAL STENT INSERTION  1. Cystoscopy  2. Retrograde Pyelography - Left  3. Ureteroscopic Laser Lithotripsy - Left Ureter Mid  4. Ureteral Stent Insertion - Left      Pre-operative Diagnosis  Calculus of ureter [N20.1]    Post-operative Diagnosis  1. Stone Left Ureter Mid      Surgeon(s) and Role:     * Dean Childers MD - Primary    Anesthesia Type  Not documented     Procedural Summary    Estimation of stone clearance: complete  Subjective stone composition: calcium  Renal papillae assessment: not observed  Unanticipated event/findings: none  Post-operative plan: remove own stent in 6 days with extraction string return to clinic in 1 month without CT scan    Narrative    Successful clearance of left mid ureteral stone.    Procedural Details    Patient is brought to the surgical suite where anesthesia is induced. he is prepped and draped in standard fashion in combined Trendelenberg and lithotomy position.    Cystoscopy: Rigid cystoscopy is performed. Anterior and posterior urethra are normal. Prostate demonstrates moderate adenopathy. Bladder is normal..     Retrograde Pyelography:  imaging fails to demonstrate radio-opaque mid ureteric stone consistent with pre-operative imaging. Left retrograde pyelography demonstrates radio-opaque mid ureteric stone with obstruction.     Ureteral Access: Left ureteral access is initiated with Sensor wire. Guide wire access to the kidney is assured. 8F dilator is inserted with minimal resistance. 10F dilator is inserted with minimal resistance.       Rigid Ureteroscopy: Rigid ureteroscope is inserted in left ureter. Stone is mildly impacted. In situ laser lithotripsy is performed and  fragments aree extracted.      Ureteral Stent Insertion: Left 7F 26 cm stent is inserted with good coil in kidney and bladder under fluoroscopic guidance. Stent extraction string left dangling from urethra.      The patient was then taken to the recovery room in good condition.     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

## 2022-09-15 NOTE — PROGRESS NOTES
I, the writer, have viewed a picture on the patient's phone showing a Negative COVID-19 result.    Livan Hoover RN on 9/15/2022 at 6:00 AM

## 2022-09-15 NOTE — ANESTHESIA POSTPROCEDURE EVALUATION
Patient: Geronimo Arvizu    Procedure: Procedure(s):  CYSTOURETEROSCOPY, RETROGRADE PYELOGRAM, LASER LITHOTRIPSY WITH CALCULUS REMOVAL AND URETERAL STENT INSERTION       Anesthesia Type:  General    Note:  Disposition: Outpatient   Postop Pain Control: Uneventful            Sign Out: Well controlled pain   PONV: No   Neuro/Psych: Uneventful            Sign Out: Acceptable/Baseline neuro status   Airway/Respiratory: Uneventful            Sign Out: Acceptable/Baseline resp. status   CV/Hemodynamics: Uneventful            Sign Out: Acceptable CV status; No obvious hypovolemia; No obvious fluid overload   Other NRE: NONE   DID A NON-ROUTINE EVENT OCCUR? No           Last vitals:  Vitals Value Taken Time   /69 09/15/22 0800   Temp 97.2  F (36.2  C) 09/15/22 0751   Pulse 62 09/15/22 0805   Resp 16 09/15/22 0751   SpO2 96 % 09/15/22 0805   Vitals shown include unvalidated device data.    Electronically Signed By: Kali Watson MD  September 15, 2022  9:15 AM

## 2022-09-15 NOTE — PROGRESS NOTES
Report given to Angel SIEGEL, patient care transferred at this time.    Martine Weems RN on 9/15/2022 at 8:30 AM

## 2022-09-19 LAB
APPEARANCE STONE: NORMAL
COMPN STONE: NORMAL
SPECIMEN WT: 9 MG

## 2022-09-21 ENCOUNTER — TELEPHONE (OUTPATIENT)
Dept: UROLOGY | Facility: CLINIC | Age: 57
End: 2022-09-21

## 2022-09-21 NOTE — TELEPHONE ENCOUNTER
Spoke with patient who was able to remove his stent without issue.  He is set up for his one month post op w/o imaging.  Sapphire Chávez RN

## 2022-10-10 ENCOUNTER — VIRTUAL VISIT (OUTPATIENT)
Dept: UROLOGY | Facility: CLINIC | Age: 57
End: 2022-10-10
Payer: COMMERCIAL

## 2022-10-10 DIAGNOSIS — N20.0 CALCULUS OF KIDNEY: Primary | ICD-10-CM

## 2022-10-10 PROCEDURE — 99212 OFFICE O/P EST SF 10 MIN: CPT | Mod: 95 | Performed by: UROLOGY

## 2022-10-10 ASSESSMENT — PAIN SCALES - GENERAL: PAINLEVEL: NO PAIN (0)

## 2022-10-10 NOTE — PROGRESS NOTES
Patient is roomed via telephone for a televisit.  Patient confirmed he is in the [facility] at the time of this appointment.  Patient understands that this visit is billable and agree to proceed with appointment.

## 2022-10-10 NOTE — PATIENT INSTRUCTIONS
Patient Stated Goal: Prevent further stones  Steps for collecting a 24 hour urine specimen    Please follow the directions carefully. All urine voided for a 24-hour period needs to be collected into the jug.  DO NOT change any of your  normal  daily habits when doing this test. Continue to follow your regular diet, intake of fluids, and usual activity level. Pick the most convenient day with your schedule, perhaps on a weekend or a day off.    Start your Diet Log the day before collection and continue on the day of urine collection.  You MUST bring Diet Log with you on follow up visit to discuss results.  One 24hr Urine Collection   Two 24hr Urine Collections  (do not collect on consecutive days)    PLEASE COMPLETE THE 2nd JUG WITHIN 1-2 WEEKS FROM THE 1st JUG    STEP 1  Empty your bladder completely into the toilet. This will be your start time. Write your full legal name, start date and time on the jug label.  Collection start and stop times need to match exactly!  For example:  6 am to 6 am.    STEP 2  The next time you urinate, empty your bladder directly into the jug or collection hat and pour urine into the jug.  Screw the lid back onto the jug.  Do not spill!    STEP 3  Place the jug in the refrigerator or a cooler with ice during the collection period.  Failure to keep it cool could cause inaccurate test results. DO NOT Freeze.    STEP 4  Continue collecting all urine into the jug for the rest of the day, for the full 24 hours.  DO NOT stop early or go over 24 hours!    STEP 5  Exactly 24 hours from start of collection, write your full legal name, stop date and time on the jug label.   Collection start and stop times need to match exactly!  For example:  6 am to 6 am.  Failure to label correctly will result in recollection of urine specimen.    STEP 6  Return each jug within 24 hours after final urination.     STEP 7  Drop off jug locations:     STEP 8  Please call [facility] after return of your final jug to  schedule your follow-up visit. [phone]

## 2022-10-10 NOTE — PROGRESS NOTES
Assessment/Plan:    Assessment & Plan   Geronimo was seen today for post op follow up.    Diagnoses and all orders for this visit:    Calculus of kidney  -     Renal Function Panel ([facility])  -     CALCIUM, IONIZED, SERUM  -     Stone formation, timed urine; Standing  -     Patient Stated Goal: Prevent further stones  -     Parathyroid Hormone Intact; Future        Stone Management Plan  Stone Management 9/13/2022 10/10/2022   Urinary Tract Infection No suspicion of infection No suspicion of infection   Renal Colic Well controlled symptoms Asymptomatic at this time   Renal Failure No suspicion of renal failure No suspicion of renal failure   Current CT date 9/9/2022 -   Right sided stones? Yes -   R Number of ureteral stones No ureteral stones -   R Number of kidney stones  2 -   R GSD of kidney stones 15 - 20 -   R Hydronephrosis None -   R Stone Event No current event No current event   R Current Plan Observe -   Observe rationale Significant stone burden amenable to emergency management -   Left sided stones? Yes -   L Number of ureteral stones 1 -   L GSD of ureteral stones 6 -   L Location of ureteral stone Proximal -   L Number of kidney stones  No renal stones -   L Hydronephrosis Mild -   L Stone Event New event Resolved event   Diagnosis date 9/9/2022 -   Initial location of primary symptomatic stone Proximal -   Initial GSD of primary symptomatic stone 6 -   Resolved date - 10/10/2022   Post-op status - No imaging   L MET Status Initiation -   L Current Plan MET -   MET 2 week F/U -           PLAN      Phone call duration: 10 minutes  15 minutes spent on the date of the encounter doing chart review, history and exam, documentation and further activities per the note    DMITRY FLAHERTY MD  [facility]    Subjective:     HPI  Mr. Geronimo Arvizu is a 57 year old  male who is being evaluated via a billable telephone visit by [facility]  [place] for late postoperative follow-up.     He returns status post Left ureteroscopic laser lithotripsy for mid ureteral stone. He has had no unanticipated post-operative events.     He is asymptomatic at present. He denies symptoms of fever, chills, flank pain, nausea, vomiting, urinary frequency and dysuria.    Imaging was not performed today because of confident stone clearance.     Stone composition was 100% calcium oxalate.     He is at risk for ongoing active stone disease and will initiate stone risk evaluation. Serum stone risk chemistries including parathyroid hormone and ionized calcium will be obtained before next visit. Two 24 hour urine collections and dietary journal will be obtained at earliest covenience.     After completing stone risk workup, will probably return for clearance of large right renal stone burden..         ROS   Review of systems is negative except for HPI.    Past Medical History:   Diagnosis Date     Renal disease        Past Surgical History:   Procedure Laterality Date     COMBINED CYSTOSCOPY, INSERT STENT URETER(S) Left 9/15/2022    Procedure: CYSTOURETEROSCOPY, RETROGRADE PYELOGRAM, LASER LITHOTRIPSY WITH CALCULUS REMOVAL AND URETERAL STENT INSERTION;  Surgeon: Dean Childers MD;  [facility]: [facility] OR      TOOTH EXTRACTION W/FORCEP       LAPAROSCOPIC HERNIORRHAPHY INGUINAL BILATERAL Bilateral 12/17/2019    Procedure: HERNIORRHAPHY, INGUINAL, BILATERAL, LAPAROSCOPIC;  Surgeon: Kevin Hadley MD;  Location: [facility] OR     RESECT TUMOR UPPER EXTREMITY Left 11/16/2017    Procedure: RESECT TUMOR UPPER EXTREMITY;  Removal Left Shoulder Tumor;  Surgeon: Callum Denson MD;  Location: [facility] OR       Current Outpatient Medications   Medication Sig Dispense Refill     ondansetron (ZOFRAN ODT) 4 MG ODT tab Take 1-2 tablets (4-8 mg) by mouth every 8 hours as needed for nausea 10 tablet 0     oxyCODONE (ROXICODONE) 5 MG tablet Take 1 tablet (5 mg) by mouth every 4 hours as  needed for breakthrough pain 10 tablet 0     tamsulosin (FLOMAX) 0.4 MG capsule Take 1 capsule (0.4 mg) by mouth daily 7 capsule 0       No Known Allergies    Social History     Socioeconomic History     Marital status:      Spouse name: Not on file     Number of children: Not on file     Years of education: Not on file     Highest education level: Not on file   Occupational History     Not on file   Tobacco Use     Smoking status: Former     Types: Cigarettes     Quit date:      Years since quittin.7     Smokeless tobacco: Never     Tobacco comments:     Quit in his early 30's.   Substance and Sexual Activity     Alcohol use: Yes     Comment: Occ.      Drug use: No     Sexual activity: Yes     Partners: Female   Other Topics Concern     Parent/sibling w/ CABG, MI or angioplasty before 65F 55M? Not Asked   Social History Narrative     Not on file     Social Determinants of Health     Financial Resource Strain: Not on file   Food Insecurity: Not on file   Transportation Needs: Not on file   Physical Activity: Not on file   Stress: Not on file   Social Connections: Not on file   Intimate Partner Violence: Not on file   Housing Stability: Not on file       Family History   Problem Relation Age of Onset     Alzheimer Disease Mother      Cirrhosis Father      Other - See Comments Sister         Uterine polyps       Objective:     No vitals or physical exam obtained due to virtual visit  Labs     Most Recent 3 CBC's:Recent Labs   Lab Test 22  0213 11/10/17  1407   WBC 10.8 12.2* 8.5   HGB 12.7* 14.3 15.8   MCV 84 84 89    269 301     Most Recent 3 BMP's:Recent Labs   Lab Test 22  0213 22  1604    139 137   POTASSIUM 4.1 3.4 4.1   CHLORIDE 104 101 104   CO2 26 26 29   BUN 13.0 16.1 14   CR 1.28* 1.01 0.93   ANIONGAP 10 12 4   JOCELYN 8.9 9.7 9.3   GLC 92 103* 86     Most Recent Urinalysis:Recent Labs   Lab Test 22  1424 22  0201  11/22/19  0810   COLOR Straw   < > Yellow   APPEARANCE Clear   < > Clear   URINEGLC Negative   < > Negative   URINEBILI Negative   < > Negative   URINEKETONE 80*   < > Negative   SG 1.010   < > 1.020   UBLD Moderate*   < > Trace*   URINEPH 6.5   < > 6.0   PROTEIN Trace*   < > Negative   UROBILINOGEN  --   --  1.0   NITRITE Negative   < > Negative   LEUKEST Negative   < > Negative   RBCU 6*   < > O - 2   WBCU 0   < > 0 - 5    < > = values in this interval not displayed.     Acute Labs Urine Culture  No results found for: CULTURE

## 2022-10-13 ENCOUNTER — TELEPHONE (OUTPATIENT)
Dept: UROLOGY | Facility: CLINIC | Age: 57
End: 2022-10-13

## 2022-12-16 ENCOUNTER — VIRTUAL VISIT (OUTPATIENT)
Dept: INTERNAL MEDICINE | Facility: CLINIC | Age: 57
End: 2022-12-16
Payer: COMMERCIAL

## 2022-12-16 ENCOUNTER — TELEPHONE (OUTPATIENT)
Dept: GASTROENTEROLOGY | Facility: CLINIC | Age: 57
End: 2022-12-16

## 2022-12-16 DIAGNOSIS — Z12.11 SCREEN FOR COLON CANCER: ICD-10-CM

## 2022-12-16 PROCEDURE — 99212 OFFICE O/P EST SF 10 MIN: CPT | Mod: 95 | Performed by: INTERNAL MEDICINE

## 2022-12-16 NOTE — TELEPHONE ENCOUNTER
Screening Questions  BLUE  KIND OF PREP RED  LOCATION [review exclusion criteria] GREEN  SEDATION TYPE        26.45 Are you active on mychart?       Keagan Gonzalez MD Ordering/Referring Provider?        na What type of coverage do you have?      n Have you had a positive covid test in the last 14 days?     26.45 1. BMI  [BMI 40+ - review exclusion criteria]    y  2. Are you able to give consent for your medical care? [IF NO,RN REVIEW]        n  3. Are you taking any prescription pain medications on a routine schedule?      n  3a. EXTENDED PREP What kind of prescription?     n 4. Do you have any chemical dependencies such as alcohol, street drugs, or methadone?    n 5. Do you have any history of post-traumatic stress syndrome, severe anxiety or history of psychosis?      **If yes 3- 5 , please schedule with MAC sedation.**          IF YES TO ANY 6 - 10 - HOSPITAL SETTING ONLY.     n 6.   Do you need assistance transferring?     n 7.   Have you had a heart or lung transplant?    n 8.   Are you currently on dialysis?   n 9.   Do you use daily home oxygen?   n 10. Do you take nitroglycerin?   10a. n If yes, how often?     11. [FEMALES]  n Are you currently pregnant?    11a. n If yes, how many weeks? [ Greater than 12 weeks, OR NEEDED]    n 12. Do you have Pulmonary Hypertension? *NEED PAC APPT AT [facility]*     n 13. [review exclusion criteria]  Do you have any implantable devices in your body (pacemaker, defib, LVAD)?    n 14. In the past 6 months, have you had any heart related issues including cardiomyopathy or heart attack?     14a. n If yes, did it require cardiac stenting if so when?     n 15. Have you had a stroke or Transient ischemic attack (TIA - aka  mini stroke ) within 6 months?      n 16. Do you have mod to severe Obstructive Sleep Apnea?  [Hospital only - Ok at [place]]    n 17. Do you have SEVERE AND UNCONTROLLED asthma? *NEED PAC APPT AT [facility]*     n 18. Are you currently taking any blood  "thinners?     18a. If yes, inform patient to \"follow up w/ ordering provider for bridging instructions.\"    n 19. Do you take the medication Phentermine?    19a. If yes, \"Hold for 7 days before procedure.  Please consult your prescribing provider if you have questions about holding this medication.\"     n  20. Do you have chronic kidney disease?      n  21. Do you have a diagnosis of diabetes?     n  22. On a regular basis do you go 3-5 days between bowel movements?     y 23. Preferred LOCAL Pharmacy for Pre Prescription    [ LIST ONLY ONE PHARMACY]          [id number] - [place], [place] - [address] AT [facility] OF [address]          - CLOSING REMINDERS -    Informed patient they will need an adult    Cannot take any type of public or medical transportation alone    Conscious Sedation- Needs  for 6 hours after the procedure       MAC/General-Needs  for 24 hours after procedure    Pre-Procedure Covid test to be completed [[facility] PCR Testing Required]    Confirmed Nurse will call to complete assessment       - SCHEDULING DETAILS -  n Hospital Setting Required? If yes, what is the exclusion?: n   Dr. Figueroa  Surgeon    4/4/23  Date of Procedure  Lower Endoscopy [Colonoscopy]  Type of Procedure Scheduled  [facility]   STANDARD GOLYTELY-If you answer yes to questions #8, #20, #21Which Colonoscopy Prep was Sent?     mac Sedation Type     na PAC / Pre-op Required                 "

## 2022-12-16 NOTE — TELEPHONE ENCOUNTER
Transferred to [place] to schedule per pt request    Screening Questions  BLUE  KIND OF PREP RED  LOCATION [review exclusion criteria] GREEN  SEDATION TYPE       y Are you active on mychart?       [place] Ordering/Referring Provider?        [employer] What type of coverage do you have?      n Have you had a positive covid test in the last 14 days?     25.8 1. BMI  [BMI 40+ - review exclusion criteria]    y  2. Are you able to give consent for your medical care? [IF NO,RN REVIEW]        n  3. Are you taking any prescription pain medications on a routine schedule?      n  3a. EXTENDED PREP What kind of prescription?     n 4. Do you have any chemical dependencies such as alcohol, street drugs, or methadone?    n 5. Do you have any history of post-traumatic stress syndrome, severe anxiety or history of psychosis?      **If yes 3- 5 , please schedule with MAC sedation.**          IF YES TO ANY 6 - 10 - HOSPITAL SETTING ONLY.     n 6.   Do you need assistance transferring?     n 7.   Have you had a heart or lung transplant?    n 8.   Are you currently on dialysis?   n 9.   Do you use daily home oxygen?   n 10. Do you take nitroglycerin?   10a. n If yes, how often?     11. [FEMALES]  n Are you currently pregnant?    11a. n If yes, how many weeks? [ Greater than 12 weeks, OR NEEDED]    n 12. Do you have Pulmonary Hypertension? *NEED PAC APPT AT [facility]*     n 13. [review exclusion criteria]  Do you have any implantable devices in your body (pacemaker, defib, LVAD)?    n 14. In the past 6 months, have you had any heart related issues including cardiomyopathy or heart attack?     14a. n If yes, did it require cardiac stenting if so when?     n 15. Have you had a stroke or Transient ischemic attack (TIA - aka  mini stroke ) within 6 months?      n 16. Do you have mod to severe Obstructive Sleep Apnea?  [Hospital only - Ok at [place]]    n 17. Do you have SEVERE AND UNCONTROLLED asthma? *NEED PAC APPT AT [facility]*     n 18. Are you  "currently taking any blood thinners?     18a. If yes, inform patient to \"follow up w/ ordering provider for bridging instructions.\"    n 19. Do you take the medication Phentermine?    19a. If yes, \"Hold for 7 days before procedure.  Please consult your prescribing provider if you have questions about holding this medication.\"     n  20. Do you have chronic kidney disease?      n  21. Do you have a diagnosis of diabetes?     n  22. On a regular basis do you go 3-5 days between bowel movements?      23. Preferred LOCAL Pharmacy for Pre Prescription    [ LIST ONLY ONE PHARMACY]          [id number] - [place], [place] - [address] AT [facility] OF [address]          - CLOSING REMINDERS -    Informed patient they will need an adult    Cannot take any type of public or medical transportation alone    Conscious Sedation- Needs  for 6 hours after the procedure       MAC/General-Needs  for 24 hours after procedure    Pre-Procedure Covid test to be completed [[facility] PCR Testing Required]    Confirmed Nurse will call to complete assessment       - SCHEDULING DETAILS -   Hospital Setting Required? If yes, what is the exclusion?:      Surgeon      Date of Procedure    Type of Procedure Scheduled  Location   Which Colonoscopy Prep was Sent?      Sedation Type      PAC / Pre-op Required                 "

## 2022-12-16 NOTE — PROGRESS NOTES
Geronimo is a 57 year old who is being evaluated via a billable telephone visit.      What phone number would you like to be contacted at? [phone]  How would you like to obtain your AVS? Velia  Distant Location (provider location):  Off-site    Assessment & Plan   Screen for colon cancer  Order placed.  - Colonoscopy Screening  Referral; Future    Discussed the benefits of having a regular PCP. He plans to look into options at nearby clinics and establish care in the coming months with a provider.    Keagan Powell MD  [facility]   Geronimo is a 57 year old who presents for a same day acute care virtual telephone visit with chief concern of: Patient is just wanting a colonoscopy order placed for him. This is the first time I have met Geronimo. No other concerns. He denies any blood in stool or bowel changes. He is wondering if he needs a PCP.    Review of Systems   Constitutional, gi systems are negative, except as otherwise noted.      Objective       Vitals: No vitals were obtained today due to virtual visit.    Physical Exam   healthy, alert and no distress  PSYCH: Alert and oriented times 3; coherent speech, normal rate and volume, able to articulate logical thoughts, able to abstract reason, no tangential thoughts, no hallucinations or delusions  His affect is normal  RESP: No cough, no audible wheezing, able to talk in full sentences  Remainder of exam unable to be completed due to telephone visits    Phone call duration: 5 minutes

## 2022-12-26 ENCOUNTER — HEALTH MAINTENANCE LETTER (OUTPATIENT)
Age: 57
End: 2022-12-26

## 2023-01-03 ENCOUNTER — NURSE TRIAGE (OUTPATIENT)
Dept: NURSING | Facility: CLINIC | Age: 58
End: 2023-01-03

## 2023-01-03 ENCOUNTER — HOSPITAL ENCOUNTER (EMERGENCY)
Facility: CLINIC | Age: 58
Discharge: LEFT BEFORE DISCHARGE | End: 2023-01-03
Payer: COMMERCIAL

## 2023-01-03 VITALS
DIASTOLIC BLOOD PRESSURE: 75 MMHG | RESPIRATION RATE: 18 BRPM | SYSTOLIC BLOOD PRESSURE: 118 MMHG | HEIGHT: 72 IN | WEIGHT: 190 LBS | TEMPERATURE: 98.4 F | HEART RATE: 63 BPM | BODY MASS INDEX: 25.73 KG/M2 | OXYGEN SATURATION: 99 %

## 2023-01-03 NOTE — TELEPHONE ENCOUNTER
A few days ago, the patient [place]owing [place] , when he had a large load and got thrown up in air, when he landed in the seat it sent shock up through Back, he was very sore but with rest it got a bit better however he lifted wood at cabin this weekend and now his pain is getting worse and worse when moving his left leg it hurts his back. He also stated that he has groin/testicle pain in that left side. Patient  Rates pain, 3/10 when not moving. 8/10 with movement.     Care advice given     Disposition: Go to ED now. 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 Patient stated that he was going to go up there now.     Leslie Saucedo RN on 1/3/2023 at 9:59 AM      Reason for Disposition    SEVERE pain in kidney area (flank) that follows a direct blow to that site    Additional Information    Negative: Dangerous mechanism of injury (e.g., MVA, contact sports, trampoline, diving, fall > 10 feet or 3 meters)  (Exception: Back pain began > 1 hour after injury.)    Negative: Weakness (i.e., paralysis, loss of muscle strength) of the leg(s) or foot and sudden onset after back injury    Negative: Numbness (i.e., loss of sensation) of the leg(s) or foot and sudden onset after back injury    Negative: Major bleeding (actively dripping or spurting) that can't be stopped    Negative: Bullet, knife or other serious penetrating wound    Negative: Shock suspected (e.g., cold/pale/clammy skin, too weak to stand, low BP, rapid pulse)    Negative: Sounds like a life-threatening emergency to the triager    Negative: Back pain from overuse (work, exercise, gardening) OR from twisting, lifting, or bending injury    Negative: Back pain not from an injury    Protocols used: BACK INJURY-A-OH

## 2023-01-03 NOTE — ED TRIAGE NOTES
Pt was plowing snow a week ago, ruth ann in an air ride seat that wasn't inflated fully. Pt has had lower back pain with pain down left leg and left testicle since then. Pt using OTC meds, ice and heat at home with minimal relief. Pt denies bowel/bladder issues.      Triage Assessment     Row Name 01/03/23 1101       Triage Assessment (Adult)    Airway WDL WDL       Respiratory WDL    Respiratory WDL WDL       Skin Circulation/Temperature WDL    Skin Circulation/Temperature WDL WDL       Cardiac WDL    Cardiac WDL WDL       Peripheral/Neurovascular WDL    Peripheral Neurovascular WDL WDL       Cognitive/Neuro/Behavioral WDL    Cognitive/Neuro/Behavioral WDL WDL

## 2023-03-28 RX ORDER — BISACODYL 5 MG/1
TABLET, DELAYED RELEASE ORAL
Qty: 4 TABLET | Refills: 0 | Status: SHIPPED | OUTPATIENT
Start: 2023-03-28

## 2023-03-31 ENCOUNTER — ANESTHESIA EVENT (OUTPATIENT)
Dept: GASTROENTEROLOGY | Facility: CLINIC | Age: 58
End: 2023-03-31
Payer: COMMERCIAL

## 2023-03-31 ASSESSMENT — LIFESTYLE VARIABLES: TOBACCO_USE: 1

## 2023-03-31 NOTE — ANESTHESIA PREPROCEDURE EVALUATION
Anesthesia Pre-Procedure Evaluation    Patient: Geronimo Arvizu   MRN: [id number] : 1965        Procedure : Procedure(s):  COLONOSCOPY          Past Medical History:   Diagnosis Date     Renal disease       Past Surgical History:   Procedure Laterality Date     COMBINED CYSTOSCOPY, INSERT STENT URETER(S) Left 9/15/2022    Procedure: CYSTOURETEROSCOPY, RETROGRADE PYELOGRAM, LASER LITHOTRIPSY WITH CALCULUS REMOVAL AND URETERAL STENT INSERTION;  Surgeon: Dean Childers MD;  Location: [facility]     HC TOOTH EXTRACTION W/FORCEP       LAPAROSCOPIC HERNIORRHAPHY INGUINAL BILATERAL Bilateral 2019    Procedure: HERNIORRHAPHY, INGUINAL, BILATERAL, LAPAROSCOPIC;  Surgeon: Kevin Hadley MD;  Location: [facility]     RESECT TUMOR UPPER EXTREMITY Left 2017    Procedure: RESECT TUMOR UPPER EXTREMITY;  Removal Left Shoulder Tumor;  Surgeon: Callum Denson MD;  Location: [facility]      No Known Allergies   Social History     Tobacco Use     Smoking status: Former     Types: Cigarettes     Quit date:      Years since quittin.2     Smokeless tobacco: Never     Tobacco comments:     Quit in his early 30's.   Substance Use Topics     Alcohol use: Yes     Comment: Occ.       Wt Readings from Last 1 Encounters:   09/15/22 88.5 kg (195 lb)        Anesthesia Evaluation   Pt has had prior anesthetic. Type: MAC and General.        ROS/MED HX  ENT/Pulmonary:     (+) tobacco use, Past use,     Neurologic:  - neg neurologic ROS     Cardiovascular:     (+) -----Previous cardiac testing   Echo: Date: Results:    Stress Test: Date: Results:    ECG Reviewed: Date:  Results:  Sinus Rhythm   WITHIN NORMAL LIMITS  Cath: Date: Results:      METS/Exercise Tolerance:     Hematologic:     (+) anemia,     Musculoskeletal:   (+) arthritis,     GI/Hepatic:       Renal/Genitourinary:     (+) renal disease,     Endo: Comment: overweight      Psychiatric/Substance Use:  - neg psychiatric ROS     Infectious Disease:   - neg infectious disease ROS     Malignancy:   (+) Malignancy, History of Lymphoma/Leukemia.    Other:            Physical Exam    Airway  airway exam normal      Mallampati: II   TM distance: > 3 FB   Neck ROM: full   Mouth opening: > 3 cm    Respiratory Devices and Support         Dental       (+) Completely normal teeth      Cardiovascular   cardiovascular exam normal          Pulmonary   pulmonary exam normal        breath sounds clear to auscultation           OUTSIDE LABS:  CBC:   Lab Results   Component Value Date    WBC 10.8 09/13/2022    WBC 12.2 (H) 09/09/2022    HGB 12.7 (L) 09/13/2022    HGB 14.3 09/09/2022    HCT 37.8 (L) 09/13/2022    HCT 43.0 09/09/2022     09/13/2022     09/09/2022     BMP:   Lab Results   Component Value Date     09/13/2022     09/09/2022    POTASSIUM 4.1 09/13/2022    POTASSIUM 3.4 09/09/2022    CHLORIDE 104 09/13/2022    CHLORIDE 101 09/09/2022    CO2 26 09/13/2022    CO2 26 09/09/2022    BUN 13.0 09/13/2022    BUN 16.1 09/09/2022    CR 1.28 (H) 09/13/2022    CR 1.01 09/09/2022    GLC 92 09/13/2022     (H) 09/09/2022     COAGS: No results found for: PTT, INR, FIBR  POC: No results found for: BGM, HCG, HCGS  HEPATIC:   Lab Results   Component Value Date    ALBUMIN 4.6 09/09/2022    PROTTOTAL 7.8 09/09/2022    ALT 17 09/09/2022    AST 22 09/09/2022    ALKPHOS 77 09/09/2022    BILITOTAL 0.9 09/09/2022     OTHER:   Lab Results   Component Value Date    JOCELYN 8.9 09/13/2022    LIPASE 192 (H) 09/09/2022       Anesthesia Plan    ASA Status:  3   NPO Status:  NPO Appropriate    Anesthesia Type: General.     - Airway: Native airway   Induction: Intravenous.   Maintenance: TIVA.        Consents    Anesthesia Plan(s) and associated risks, benefits, and realistic alternatives discussed. Questions answered and patient/representative(s) expressed understanding.     - Discussed: Risks, Benefits and Alternatives for BOTH SEDATION and the PROCEDURE were discussed      - Discussed with:  Patient         Postoperative Care            Comments:                TIFFANY Kimball CRNA

## 2023-04-04 ENCOUNTER — HOSPITAL ENCOUNTER (OUTPATIENT)
Facility: CLINIC | Age: 58
Discharge: HOME | End: 2023-04-04
Attending: SURGERY | Admitting: SURGERY
Payer: COMMERCIAL

## 2023-04-04 ENCOUNTER — ANESTHESIA (OUTPATIENT)
Dept: GASTROENTEROLOGY | Facility: CLINIC | Age: 58
End: 2023-04-04
Payer: COMMERCIAL

## 2023-04-04 VITALS
HEIGHT: 72 IN | RESPIRATION RATE: 17 BRPM | WEIGHT: 195 LBS | HEART RATE: 63 BPM | OXYGEN SATURATION: 98 % | TEMPERATURE: 98 F | SYSTOLIC BLOOD PRESSURE: 112 MMHG | DIASTOLIC BLOOD PRESSURE: 101 MMHG | BODY MASS INDEX: 26.41 KG/M2

## 2023-04-04 DIAGNOSIS — Z12.11 SPECIAL SCREENING FOR MALIGNANT NEOPLASMS, COLON: Primary | ICD-10-CM

## 2023-04-04 LAB — COLONOSCOPY: NORMAL

## 2023-04-04 PROCEDURE — 45380 COLONOSCOPY AND BIOPSY: CPT | Performed by: SURGERY

## 2023-04-04 PROCEDURE — 370N000017 HC ANESTHESIA TECHNICAL FEE, PER MIN: Performed by: SURGERY

## 2023-04-04 PROCEDURE — 88305 TISSUE EXAM BY PATHOLOGIST: CPT | Mod: 26 | Performed by: PATHOLOGY

## 2023-04-04 PROCEDURE — 45385 COLONOSCOPY W/LESION REMOVAL: CPT | Mod: PT | Performed by: SURGERY

## 2023-04-04 PROCEDURE — 250N000009 HC RX 250: Performed by: NURSE ANESTHETIST, CERTIFIED REGISTERED

## 2023-04-04 PROCEDURE — 88305 TISSUE EXAM BY PATHOLOGIST: CPT | Mod: TC | Performed by: SURGERY

## 2023-04-04 PROCEDURE — 250N000009 HC RX 250: Performed by: SURGERY

## 2023-04-04 PROCEDURE — 258N000003 HC RX IP 258 OP 636: Performed by: SURGERY

## 2023-04-04 PROCEDURE — 250N000011 HC RX IP 250 OP 636: Performed by: NURSE ANESTHETIST, CERTIFIED REGISTERED

## 2023-04-04 RX ORDER — ONDANSETRON 2 MG/ML
4 INJECTION INTRAMUSCULAR; INTRAVENOUS EVERY 30 MIN PRN
Status: DISCONTINUED | OUTPATIENT
Start: 2023-04-04 | End: 2023-04-04 | Stop reason: HOSPADM

## 2023-04-04 RX ORDER — PROPOFOL 10 MG/ML
INJECTION, EMULSION INTRAVENOUS PRN
Status: DISCONTINUED | OUTPATIENT
Start: 2023-04-04 | End: 2023-04-04

## 2023-04-04 RX ORDER — LIDOCAINE 40 MG/G
CREAM TOPICAL
Status: DISCONTINUED | OUTPATIENT
Start: 2023-04-04 | End: 2023-04-04 | Stop reason: HOSPADM

## 2023-04-04 RX ORDER — PROPOFOL 10 MG/ML
INJECTION, EMULSION INTRAVENOUS CONTINUOUS PRN
Status: DISCONTINUED | OUTPATIENT
Start: 2023-04-04 | End: 2023-04-04

## 2023-04-04 RX ORDER — NALOXONE HYDROCHLORIDE 0.4 MG/ML
0.2 INJECTION, SOLUTION INTRAMUSCULAR; INTRAVENOUS; SUBCUTANEOUS
Status: DISCONTINUED | OUTPATIENT
Start: 2023-04-04 | End: 2023-04-04 | Stop reason: HOSPADM

## 2023-04-04 RX ORDER — NALOXONE HYDROCHLORIDE 0.4 MG/ML
0.4 INJECTION, SOLUTION INTRAMUSCULAR; INTRAVENOUS; SUBCUTANEOUS
Status: DISCONTINUED | OUTPATIENT
Start: 2023-04-04 | End: 2023-04-04 | Stop reason: HOSPADM

## 2023-04-04 RX ORDER — ALBUTEROL SULFATE 0.83 MG/ML
2.5 SOLUTION RESPIRATORY (INHALATION) EVERY 4 HOURS PRN
Status: DISCONTINUED | OUTPATIENT
Start: 2023-04-04 | End: 2023-04-04 | Stop reason: HOSPADM

## 2023-04-04 RX ORDER — FLUMAZENIL 0.1 MG/ML
0.2 INJECTION, SOLUTION INTRAVENOUS
Status: DISCONTINUED | OUTPATIENT
Start: 2023-04-04 | End: 2023-04-04 | Stop reason: HOSPADM

## 2023-04-04 RX ORDER — ONDANSETRON 4 MG/1
4 TABLET, ORALLY DISINTEGRATING ORAL EVERY 6 HOURS PRN
Status: DISCONTINUED | OUTPATIENT
Start: 2023-04-04 | End: 2023-04-04 | Stop reason: HOSPADM

## 2023-04-04 RX ORDER — SODIUM CHLORIDE, SODIUM LACTATE, POTASSIUM CHLORIDE, CALCIUM CHLORIDE 600; 310; 30; 20 MG/100ML; MG/100ML; MG/100ML; MG/100ML
INJECTION, SOLUTION INTRAVENOUS CONTINUOUS
Status: DISCONTINUED | OUTPATIENT
Start: 2023-04-04 | End: 2023-04-04 | Stop reason: HOSPADM

## 2023-04-04 RX ORDER — PROCHLORPERAZINE MALEATE 10 MG
10 TABLET ORAL EVERY 6 HOURS PRN
Status: DISCONTINUED | OUTPATIENT
Start: 2023-04-04 | End: 2023-04-04 | Stop reason: HOSPADM

## 2023-04-04 RX ORDER — ONDANSETRON 4 MG/1
4 TABLET, ORALLY DISINTEGRATING ORAL EVERY 30 MIN PRN
Status: DISCONTINUED | OUTPATIENT
Start: 2023-04-04 | End: 2023-04-04 | Stop reason: HOSPADM

## 2023-04-04 RX ORDER — ONDANSETRON 2 MG/ML
4 INJECTION INTRAMUSCULAR; INTRAVENOUS EVERY 6 HOURS PRN
Status: DISCONTINUED | OUTPATIENT
Start: 2023-04-04 | End: 2023-04-04 | Stop reason: HOSPADM

## 2023-04-04 RX ORDER — GLYCOPYRROLATE 0.2 MG/ML
INJECTION, SOLUTION INTRAMUSCULAR; INTRAVENOUS PRN
Status: DISCONTINUED | OUTPATIENT
Start: 2023-04-04 | End: 2023-04-04

## 2023-04-04 RX ADMIN — SODIUM CHLORIDE, POTASSIUM CHLORIDE, SODIUM LACTATE AND CALCIUM CHLORIDE: 600; 310; 30; 20 INJECTION, SOLUTION INTRAVENOUS at 09:01

## 2023-04-04 RX ADMIN — GLYCOPYRROLATE 0.2 MG: 0.2 INJECTION, SOLUTION INTRAMUSCULAR; INTRAVENOUS at 09:22

## 2023-04-04 RX ADMIN — PROPOFOL 50 MG: 10 INJECTION, EMULSION INTRAVENOUS at 09:19

## 2023-04-04 RX ADMIN — LIDOCAINE HYDROCHLORIDE 0.1 ML: 10 INJECTION, SOLUTION EPIDURAL; INFILTRATION; INTRACAUDAL; PERINEURAL at 09:01

## 2023-04-04 RX ADMIN — PROPOFOL 200 MCG/KG/MIN: 10 INJECTION, EMULSION INTRAVENOUS at 09:19

## 2023-04-04 ASSESSMENT — ACTIVITIES OF DAILY LIVING (ADL): ADLS_ACUITY_SCORE: 35

## 2023-04-04 NOTE — LETTER
April 10, 2023      Geronimo Arvizu  [address]  [facility] [address]        Dear ,    We are writing to inform you of your test results.    Your pathology report was:    Showed an Adenomatous polyp. This is a benign (not cancerous) growth; however these can become cancer over time. This polyp is usually removed completely at the time of the biopsy. Because it is an Adenomatous polyp you do have a slight higher risk for colon cancer. This is why you will need a repeat colonoscopy in approximately 7 years .  If you do have further questions please don t hesitate to call the below number.      To make an appointment Please call [phone], for  [facility] or  for [facility], to schedule a follow up appointment in 2 weeks.       Resulted Orders   Surgical Pathology Exam   Result Value Ref Range    Case Report       Surgical Pathology Report                         Case: [id number]                                  Authorizing Provider:  Cas Figueroa MD Collected:           04/04/2023 09:32 AM          Ordering Location:     [facility]   Received:            04/04/2023 10:51 AM                                 [place]                                                                      Pathologist:           Vianney Stovall MD                                                                           Specimens:   A) - Large Intestine, Colon, Ascending, Mid ascending colon polyp                                   B) - Large Intestine, Colon, polyp at 20cm                                                 Final Diagnosis       A: Large intestine, mid ascending, polypectomy:  -Tubular adenoma, no evidence of high-grade dysplasia or invasive carcinoma    B: Large intestine, 20 cm, polypectomy:  -Tubular adenoma, no evidence of high-grade dysplasia or invasive  "carcinoma        Clinical Information       Procedure:  COLONOSCOPY with polypectomy  Pre-op Diagnosis: Screen for colon cancer [Z12.11]  Post-op Diagnosis: Z12.11 - Screen for colon cancer [ICD-10-CM]        Gross Description       A(1). Large Intestine, Colon, Ascending, Mid ascending colon polyp:  The specimen is received in formalin labeled with the patient's name, medical record number and other identifying information and designated \"mid ascending colon polyp\". It consists of multiple tan, polypoid soft tissue fragments, ranging from 0.1-0.5 cm in greatest dimension.  Entirely submitted in 1 cassette.    B(2). Large Intestine, Colon, polyp at 20cm:  The specimen is received in formalin labeled with the patient's name, medical record number and other identifying information and designated \"colon polyp at 20 cm\". It consists of a single 0.6 cm tan, polypoid soft tissue fragment.  The resection margin is inked black, the specimen is sectioned and entirely submitted in 1 cassette.   (RACHEL Villavicencio (ASCP))      Microscopic Description       A, B.  A formal microscopic examination has been performed      Performing Labs       The technical component of this testing was completed at [facility]      Case Images         If you have any questions or concerns, please call the clinic at the number listed above.       Sincerely,      Cas Figueroa MD            "

## 2023-04-04 NOTE — H&P
ENDOSCOPY PRE-SEDATION H&P FOR OUTPATIENT PROCEDURES    Geronimo Arvizu  [id number]  1965    Procedure:   Colonoscopy possible biopsy, possible polypectomy, with MAC sedation.     Pre-procedure diagnosis: screen    Past medical history:   Past Medical History:   Diagnosis Date     Renal disease        Past surgical history:   Past Surgical History:   Procedure Laterality Date     COMBINED CYSTOSCOPY, INSERT STENT URETER(S) Left 9/15/2022    Procedure: CYSTOURETEROSCOPY, RETROGRADE PYELOGRAM, LASER LITHOTRIPSY WITH CALCULUS REMOVAL AND URETERAL STENT INSERTION;  Surgeon: Dean Childers MD;  Location: [facility]     HC TOOTH EXTRACTION W/FORCEP       LAPAROSCOPIC HERNIORRHAPHY INGUINAL BILATERAL Bilateral 12/17/2019    Procedure: HERNIORRHAPHY, INGUINAL, BILATERAL, LAPAROSCOPIC;  Surgeon: Kevin Hadley MD;  Location: [facility]     RESECT TUMOR UPPER EXTREMITY Left 11/16/2017    Procedure: RESECT TUMOR UPPER EXTREMITY;  Removal Left Shoulder Tumor;  Surgeon: Callum Denson MD;  Location: [facility]       Current Facility-Administered Medications   Medication     lactated ringers infusion     lidocaine (LMX4) kit     lidocaine 1 % 0.1-1 mL     sodium chloride (PF) 0.9% PF flush 3 mL     sodium chloride (PF) 0.9% PF flush 3 mL       No Known Allergies    History of Anesthesia/Sedation Problems: no    Physical Exam:    Mental status: alert  Heart: Normal  Lung: Normal  Assessment of patient's airway: Normal  Other as pertinent for procedure: None     ASA Score: See Provation note    Mallampati score:  I - Faucial pillars, soft palate, and uvula are visible    Assessment/Plan:     The patient is an appropriate candidate to receive sedation.    Informed consent was discussed with the patient/family, including the risks, benefits, potential complications and any alternative options associated with sedation.    Patient assessment completed just prior to sedation and while under constant observation by the  provider. Condition determined to be adequate for proceeding with sedation.    The specific risks for the procedure were discussed with the patient at the time of informed consent and include but are not limited to perforation which could require surgery, missing significant neoplasm or lesion, hemorrhage and adverse sedative complication.      Cas Figueroa MD

## 2023-04-04 NOTE — PROGRESS NOTES
[facility] DISCHARGE NOTE    Patient discharged to home at 10:19 AM via ambulation. Accompanied by spouse and staff. Discharge instructions reviewed with patient and spouse, opportunity offered to ask questions. Prescriptions - None ordered for discharge. All belongings sent with patient.    Stacy Bravo RN

## 2023-04-04 NOTE — ANESTHESIA CARE TRANSFER NOTE
Patient: Geronimo Arvizu    Procedure: Procedure(s):  COLONOSCOPY       Diagnosis: Screen for colon cancer [Z12.11]  Diagnosis Additional Information: No value filed.    Anesthesia Type:   No value filed.     Note:    Oropharynx: spontaneously breathing  Level of Consciousness: drowsy  Oxygen Supplementation: room air    Independent Airway: airway patency satisfactory and stable  Dentition: dentition unchanged  Vital Signs Stable: post-procedure vital signs reviewed and stable  Report to RN Given: handoff report given  Patient transferred to: Phase II    Handoff Report: Identifed the Patient, Identified the Reponsible Provider, Reviewed the pertinent medical history, Discussed the surgical course, Reviewed Intra-OP anesthesia mangement and issues during anesthesia, Set expectations for post-procedure period and Allowed opportunity for questions and acknowledgement of understanding      Vitals:  Vitals Value Taken Time   BP     Temp     Pulse     Resp     SpO2         Electronically Signed By: TIFFANY Gregg CRNA  April 4, 2023  9:14 AM

## 2023-04-05 LAB
PATH REPORT.COMMENTS IMP SPEC: NORMAL
PATH REPORT.COMMENTS IMP SPEC: NORMAL
PATH REPORT.FINAL DX SPEC: NORMAL
PATH REPORT.GROSS SPEC: NORMAL
PATH REPORT.MICROSCOPIC SPEC OTHER STN: NORMAL
PATH REPORT.RELEVANT HX SPEC: NORMAL
PHOTO IMAGE: NORMAL

## 2023-05-22 ENCOUNTER — TRANSFERRED RECORDS (OUTPATIENT)
Dept: HEALTH INFORMATION MANAGEMENT | Facility: CLINIC | Age: 58
End: 2023-05-22
Payer: COMMERCIAL

## 2024-02-04 ENCOUNTER — HEALTH MAINTENANCE LETTER (OUTPATIENT)
Age: 59
End: 2024-02-04

## 2024-05-29 ENCOUNTER — TRANSFERRED RECORDS (OUTPATIENT)
Dept: HEALTH INFORMATION MANAGEMENT | Facility: CLINIC | Age: 59
End: 2024-05-29
Payer: COMMERCIAL

## 2025-03-02 ENCOUNTER — HEALTH MAINTENANCE LETTER (OUTPATIENT)
Age: 60
End: 2025-03-02

## 2025-05-13 ENCOUNTER — ANCILLARY PROCEDURE (OUTPATIENT)
Dept: GENERAL RADIOLOGY | Facility: CLINIC | Age: 60
End: 2025-05-13
Attending: NURSE PRACTITIONER
Payer: COMMERCIAL

## 2025-05-13 ENCOUNTER — OFFICE VISIT (OUTPATIENT)
Dept: FAMILY MEDICINE | Facility: CLINIC | Age: 60
End: 2025-05-13
Payer: COMMERCIAL

## 2025-05-13 VITALS
SYSTOLIC BLOOD PRESSURE: 132 MMHG | RESPIRATION RATE: 16 BRPM | DIASTOLIC BLOOD PRESSURE: 86 MMHG | HEART RATE: 64 BPM | BODY MASS INDEX: 29.74 KG/M2 | TEMPERATURE: 98.4 F | OXYGEN SATURATION: 95 % | HEIGHT: 72 IN | WEIGHT: 219.6 LBS

## 2025-05-13 DIAGNOSIS — R05.3 PERSISTENT COUGH FOR 3 WEEKS OR LONGER: Primary | ICD-10-CM

## 2025-05-13 DIAGNOSIS — J20.9 ACUTE BRONCHITIS WITH SYMPTOMS > 10 DAYS: ICD-10-CM

## 2025-05-13 PROCEDURE — 3075F SYST BP GE 130 - 139MM HG: CPT | Performed by: NURSE PRACTITIONER

## 2025-05-13 PROCEDURE — 99213 OFFICE O/P EST LOW 20 MIN: CPT | Performed by: NURSE PRACTITIONER

## 2025-05-13 PROCEDURE — 3079F DIAST BP 80-89 MM HG: CPT | Performed by: NURSE PRACTITIONER

## 2025-05-13 PROCEDURE — 1125F AMNT PAIN NOTED PAIN PRSNT: CPT | Performed by: NURSE PRACTITIONER

## 2025-05-13 PROCEDURE — 71046 X-RAY EXAM CHEST 2 VIEWS: CPT | Mod: TC | Performed by: RADIOLOGY

## 2025-05-13 RX ORDER — DOXYCYCLINE 100 MG/1
100 CAPSULE ORAL 2 TIMES DAILY
Qty: 14 CAPSULE | Refills: 0 | Status: SHIPPED | OUTPATIENT
Start: 2025-05-13 | End: 2025-05-20

## 2025-05-13 RX ORDER — PREDNISONE 20 MG/1
40 TABLET ORAL DAILY
Qty: 10 TABLET | Refills: 0 | Status: SHIPPED | OUTPATIENT
Start: 2025-05-13 | End: 2025-05-18

## 2025-05-13 ASSESSMENT — PAIN SCALES - GENERAL: PAINLEVEL_OUTOF10: MODERATE PAIN (5)

## 2025-05-13 NOTE — PATIENT INSTRUCTIONS
Prednisone 40 mg once daily - take in the morning with food for 5 days.    Prescription written for antibiotics to be taken twice daily for 7 days.  Avoid direct sun light exposure - wear sun screen and hat with this medication.      Follow-up in ED if symptoms suddenly worsen or in clinic having continued shortness of breath or cough despite treatment.    Dhara Ohara, DNP

## 2025-05-13 NOTE — PROGRESS NOTES
Assessment & Plan     Persistent cough for 3 weeks or longer  Persistent cough with wheezing and small amount of sputum, rhonchi on L lung and bilateral expiratory wheezing heard on exam. Plan to treat with prednisone and doxycycline with instruction to follow up if no improvement. Await Xray result from radiology.  - XR Chest 2 Views  - predniSONE (DELTASONE) 20 MG tablet  Dispense: 10 tablet; Refill: 0  - doxycycline hyclate (VIBRAMYCIN) 100 MG capsule  Dispense: 14 capsule; Refill: 0    Acute bronchitis with symptoms > 10 days  See above  - XR Chest 2 Views  - predniSONE (DELTASONE) 20 MG tablet  Dispense: 10 tablet; Refill: 0  - doxycycline hyclate (VIBRAMYCIN) 100 MG capsule  Dispense: 14 capsule; Refill: 0      BMI  Estimated body mass index is 29.78 kg/m  as calculated from the following:    Height as of this encounter: 1.829 m (6').    Weight as of this encounter: 99.6 kg (219 lb 9.6 oz).   Weight management plan: Patient was referred to their PCP to discuss a diet and exercise plan.      Follow-up   Return if symptoms worsen or fail to improve.        Monica Melton is a 59 year old, presenting for the following health issues:  Respiratory Problems        5/13/2025     2:43 PM   Additional Questions   Roomed by Bennett TOLEDO CMA   Accompanied by self         5/13/2025     2:43 PM   Patient Reported Additional Medications   Patient reports taking the following new medications Mucinex     HPI      Patient reports intermittent chest congestion and coughing since March, around the time he was working on pulling out cabinets at his cabin. He notes excessive coughing with some wheezing while only getting small amounts of clear/white sputum. He coughs for up to 20-40 minutes at a time. The chest congestion feels worse on the right than the left. He is not sure if he has had a fever but has been feeling malaise and fatigue over the past few weeks. Mucinex has not been helpful as he is not able to cough up  secretions, he has not been using any cough suppressants.  Acute Illness  Acute illness concerns: lung congestion  Onset/Duration: on and off since March   Symptoms:  Fever: YES- occ feels feverish , has not checked temp   Chills/Sweats: No  Headache (location?): No  Sinus Pressure: No  Conjunctivitis:  No  Ear Pain: no  Rhinorrhea: YES- after taking mucinex   Congestion: YES- Lungs   Sore Throat: No  Cough: YES-non-productive  Shortness of breath with exertion  Shoulder areas hurt from coughing   Wheeze: YES  Decreased Appetite: No  Nausea: No  Vomiting: No  Diarrhea: No  Dysuria/Freq.: No  Dysuria or Hematuria: No  Fatigue/Achiness: YES- fatigue   Sick/Strep Exposure: No  Therapies tried and outcome: Mucinex   Not a current smoker.    Review of Systems  CONSTITUTIONAL: NEGATIVE for fever, chills, change in weight  ENT/MOUTH: NEGATIVE for ear, mouth and throat problems  RESP:POSITIVE for cough-productive, dyspnea on exertion, sputum clear/white, and wheezing  CV: NEGATIVE for chest pain, palpitations or peripheral edema      Objective    /86 (BP Location: Right arm, Patient Position: Sitting, Cuff Size: Adult Regular)   Pulse 64   Temp 98.4  F (36.9  C) (Tympanic)   Resp 16   Ht 1.829 m (6')   Wt 99.6 kg (219 lb 9.6 oz)   SpO2 95%   BMI 29.78 kg/m    Body mass index is 29.78 kg/m .  Physical Exam   GENERAL: alert and no distress  HENT: ear canals and TM's normal, nose and mouth without ulcers or lesions  NECK: no adenopathy, no asymmetry, masses, or scars  RESP: rhonchi R upper anterior, R upper posterior, and R lower posterior and expiratory wheezes throughout, no dyspnea, RR 20.  CV: regular rate and rhythm, normal S1 S2, no S3 or S4, no murmur, click or rub, no peripheral edema   MS: no gross musculoskeletal defects noted, no edema  PSYCH: mentation appears normal, affect normal/bright    X-ray ordered and Official radiology interpretation pending        I saw this patient in collaboration with  Rach Maynard NP Student        I was present with the APRN/PA student Rach Maynard NP Student() who participated in the service and in the documentation of the services provided. I have verified the history and personally performed the physical exam and medical decision making, as documented by the student and edited by me.    Dhara Ohara DNP      Signed Electronically by: Dhara Ohara DNP

## 2025-05-15 ENCOUNTER — RESULTS FOLLOW-UP (OUTPATIENT)
Dept: FAMILY MEDICINE | Facility: CLINIC | Age: 60
End: 2025-05-15

## 2025-05-15 ENCOUNTER — LAB (OUTPATIENT)
Dept: LAB | Facility: CLINIC | Age: 60
End: 2025-05-15
Payer: COMMERCIAL

## 2025-05-15 DIAGNOSIS — R93.89 ABNORMAL MAGNETIC RESONANCE IMAGING STUDY: ICD-10-CM

## 2025-05-15 DIAGNOSIS — R93.89 ABNORMAL MAGNETIC RESONANCE IMAGING STUDY: Primary | ICD-10-CM

## 2025-05-15 LAB
ALBUMIN UR-MCNC: NEGATIVE MG/DL
APPEARANCE UR: CLEAR
BACTERIA #/AREA URNS HPF: ABNORMAL /HPF
BILIRUB UR QL STRIP: NEGATIVE
COLOR UR AUTO: YELLOW
GLUCOSE UR STRIP-MCNC: NEGATIVE MG/DL
HGB UR QL STRIP: NEGATIVE
KETONES UR STRIP-MCNC: NEGATIVE MG/DL
LEUKOCYTE ESTERASE UR QL STRIP: NEGATIVE
MUCOUS THREADS #/AREA URNS LPF: PRESENT /LPF
NITRATE UR QL: NEGATIVE
PH UR STRIP: 5.5 [PH] (ref 5–7)
RBC #/AREA URNS AUTO: ABNORMAL /HPF
SP GR UR STRIP: 1.02 (ref 1–1.03)
UROBILINOGEN UR STRIP-ACNC: 0.2 E.U./DL
WBC #/AREA URNS AUTO: ABNORMAL /HPF

## 2025-05-17 LAB — BACTERIA UR CULT: NO GROWTH

## (undated) DEVICE — SOL NACL 0.9% IRRIG 1000ML BOTTLE 2F7124

## (undated) DEVICE — LINEN TOWEL PACK X5 5464

## (undated) DEVICE — ENDO TROCAR FIRST ENTRY KII FIOS ADV FIX 05X100MM CFF03

## (undated) DEVICE — Device

## (undated) DEVICE — SU SILK 3-0 SH 30" K832H

## (undated) DEVICE — SOL NACL 0.9% INJ 1000ML BAG 07983-09

## (undated) DEVICE — GLOVE PROTEXIS POWDER FREE SMT 7.0  2D72PT70X

## (undated) DEVICE — DRAPE IOBAN INCISE 23X17" 6650EZ

## (undated) DEVICE — GOWN XLG DISP 9545

## (undated) DEVICE — STOCKING SLEEVE COMPRESSION CALF MED

## (undated) DEVICE — SPONGE SURGIFOAM 100 1974

## (undated) DEVICE — LINEN ORTHO PACK 5446

## (undated) DEVICE — SU PDS II 3-0 PS-2 18" Z497G

## (undated) DEVICE — ENDO TROCAR BLUNT TIP KII BALLOON 12X100MM C0R47

## (undated) DEVICE — GLOVE PROTEXIS W/NEU-THERA 7.5  2D73TE75

## (undated) DEVICE — ESU GROUND PAD ADULT W/CORD E7507

## (undated) DEVICE — SU SILK 2-0 TIE 12X30" A305H

## (undated) DEVICE — BLADE CLIPPER 4406

## (undated) DEVICE — SU VICRYL 2-0 CT-1 27" UND J259H

## (undated) DEVICE — ENDO SNARE EXACTO COLD 9MM LOOP 2.4MMX230CM 00711115

## (undated) DEVICE — GLOVE PROTEXIS BLUE W/NEU-THERA 8.0  2D73EB80

## (undated) DEVICE — ENDO TROCAR SLEEVE KII ADV FIXATION 05X100MM CFS02

## (undated) DEVICE — SU VICRYL 4-0 FS-2 27" J422-H

## (undated) DEVICE — SUCTION MANIFOLD NEPTUNE 2 SYS 4 PORT 0702-020-000

## (undated) DEVICE — SU DERMABOND ADVANCED .7ML DNX12

## (undated) DEVICE — SU VICRYL 0 UR-6 27" J603H

## (undated) DEVICE — SOL NACL 0.9% IRRIG 1000ML BOTTLE 07138-09

## (undated) DEVICE — SOL WATER IRRIG 1000ML BOTTLE 2F7114

## (undated) DEVICE — SLING ARM LG 79-99157

## (undated) DEVICE — PREP CHLORAPREP 26ML TINTED ORANGE  260815

## (undated) DEVICE — SU VICRYL 2-0 SH 27" UND J417H

## (undated) DEVICE — SPONGE RAY-TEC 4X8" 7318

## (undated) DEVICE — ENDO TROCAR DISSECTING BALLOON OMS-PDBS2

## (undated) DEVICE — DRSG AQUACEL AG 3.5X9.75" HYDROFIBER 412011

## (undated) DEVICE — ESU PENCIL W/HOLSTER

## (undated) DEVICE — GLOVE PROTEXIS BLUE W/NEU-THERA 7.5  2D73EB75

## (undated) DEVICE — CLIP HORIZON MED BLUE 002200

## (undated) DEVICE — ADH SKIN CLOSURE PREMIERPRO EXOFIN 1.0ML 3470

## (undated) DEVICE — DEVICE ENDO CLIP INSTINCT PLUS INSC-P-7-230-S  G58010

## (undated) DEVICE — DECANTER VIAL 2006S

## (undated) DEVICE — DRAPE STERI U 1015

## (undated) RX ORDER — LIDOCAINE HYDROCHLORIDE 10 MG/ML
INJECTION, SOLUTION EPIDURAL; INFILTRATION; INTRACAUDAL; PERINEURAL
Status: DISPENSED
Start: 2019-12-17

## (undated) RX ORDER — ONDANSETRON 2 MG/ML
INJECTION INTRAMUSCULAR; INTRAVENOUS
Status: DISPENSED
Start: 2017-11-16

## (undated) RX ORDER — GABAPENTIN 300 MG/1
CAPSULE ORAL
Status: DISPENSED
Start: 2017-11-16

## (undated) RX ORDER — ACETAMINOPHEN 325 MG/1
TABLET ORAL
Status: DISPENSED
Start: 2017-11-16

## (undated) RX ORDER — PROPOFOL 10 MG/ML
INJECTION, EMULSION INTRAVENOUS
Status: DISPENSED
Start: 2017-11-16

## (undated) RX ORDER — LIDOCAINE HYDROCHLORIDE 20 MG/ML
INJECTION, SOLUTION EPIDURAL; INFILTRATION; INTRACAUDAL; PERINEURAL
Status: DISPENSED
Start: 2017-11-16

## (undated) RX ORDER — FENTANYL CITRATE 50 UG/ML
INJECTION, SOLUTION INTRAMUSCULAR; INTRAVENOUS
Status: DISPENSED
Start: 2017-11-16

## (undated) RX ORDER — DEXAMETHASONE SODIUM PHOSPHATE 4 MG/ML
INJECTION, SOLUTION INTRA-ARTICULAR; INTRALESIONAL; INTRAMUSCULAR; INTRAVENOUS; SOFT TISSUE
Status: DISPENSED
Start: 2017-11-16

## (undated) RX ORDER — HYDROCODONE BITARTRATE AND ACETAMINOPHEN 5; 325 MG/1; MG/1
TABLET ORAL
Status: DISPENSED
Start: 2019-12-17

## (undated) RX ORDER — ONDANSETRON 2 MG/ML
INJECTION INTRAMUSCULAR; INTRAVENOUS
Status: DISPENSED
Start: 2019-12-17

## (undated) RX ORDER — FENTANYL CITRATE 50 UG/ML
INJECTION, SOLUTION INTRAMUSCULAR; INTRAVENOUS
Status: DISPENSED
Start: 2019-12-17

## (undated) RX ORDER — GABAPENTIN 300 MG/1
CAPSULE ORAL
Status: DISPENSED
Start: 2019-12-17

## (undated) RX ORDER — CEFAZOLIN SODIUM 2 G/100ML
INJECTION, SOLUTION INTRAVENOUS
Status: DISPENSED
Start: 2019-12-17

## (undated) RX ORDER — OXYCODONE HYDROCHLORIDE 5 MG/1
TABLET ORAL
Status: DISPENSED
Start: 2017-11-16

## (undated) RX ORDER — BUPIVACAINE HYDROCHLORIDE AND EPINEPHRINE 2.5; 5 MG/ML; UG/ML
INJECTION, SOLUTION EPIDURAL; INFILTRATION; INTRACAUDAL; PERINEURAL
Status: DISPENSED
Start: 2019-12-17

## (undated) RX ORDER — PROPOFOL 10 MG/ML
INJECTION, EMULSION INTRAVENOUS
Status: DISPENSED
Start: 2019-12-17

## (undated) RX ORDER — KETOROLAC TROMETHAMINE 30 MG/ML
INJECTION, SOLUTION INTRAMUSCULAR; INTRAVENOUS
Status: DISPENSED
Start: 2017-11-16

## (undated) RX ORDER — CELECOXIB 200 MG/1
CAPSULE ORAL
Status: DISPENSED
Start: 2019-12-17

## (undated) RX ORDER — ACETAMINOPHEN 325 MG/1
TABLET ORAL
Status: DISPENSED
Start: 2019-12-17

## (undated) RX ORDER — DEXAMETHASONE SODIUM PHOSPHATE 4 MG/ML
INJECTION, SOLUTION INTRA-ARTICULAR; INTRALESIONAL; INTRAMUSCULAR; INTRAVENOUS; SOFT TISSUE
Status: DISPENSED
Start: 2019-12-17

## (undated) RX ORDER — BUPIVACAINE HYDROCHLORIDE 2.5 MG/ML
INJECTION, SOLUTION EPIDURAL; INFILTRATION; INTRACAUDAL
Status: DISPENSED
Start: 2017-11-16